# Patient Record
Sex: FEMALE | Race: WHITE | NOT HISPANIC OR LATINO
[De-identification: names, ages, dates, MRNs, and addresses within clinical notes are randomized per-mention and may not be internally consistent; named-entity substitution may affect disease eponyms.]

---

## 2017-03-06 ENCOUNTER — RESULT REVIEW (OUTPATIENT)
Age: 47
End: 2017-03-06

## 2017-07-21 ENCOUNTER — APPOINTMENT (OUTPATIENT)
Dept: ULTRASOUND IMAGING | Facility: IMAGING CENTER | Age: 47
End: 2017-07-21

## 2017-07-21 ENCOUNTER — OUTPATIENT (OUTPATIENT)
Dept: OUTPATIENT SERVICES | Facility: HOSPITAL | Age: 47
LOS: 1 days | End: 2017-07-21
Payer: COMMERCIAL

## 2017-07-21 ENCOUNTER — APPOINTMENT (OUTPATIENT)
Dept: MAMMOGRAPHY | Facility: IMAGING CENTER | Age: 47
End: 2017-07-21

## 2017-07-21 DIAGNOSIS — Z00.8 ENCOUNTER FOR OTHER GENERAL EXAMINATION: ICD-10-CM

## 2017-07-21 PROCEDURE — 77066 DX MAMMO INCL CAD BI: CPT

## 2017-07-21 PROCEDURE — G0279: CPT

## 2017-07-21 PROCEDURE — 76641 ULTRASOUND BREAST COMPLETE: CPT

## 2017-08-01 ENCOUNTER — OUTPATIENT (OUTPATIENT)
Dept: OUTPATIENT SERVICES | Facility: HOSPITAL | Age: 47
LOS: 1 days | End: 2017-08-01
Payer: COMMERCIAL

## 2017-08-01 ENCOUNTER — RESULT REVIEW (OUTPATIENT)
Age: 47
End: 2017-08-01

## 2017-08-01 ENCOUNTER — APPOINTMENT (OUTPATIENT)
Dept: ULTRASOUND IMAGING | Facility: IMAGING CENTER | Age: 47
End: 2017-08-01
Payer: COMMERCIAL

## 2017-08-01 DIAGNOSIS — Z00.8 ENCOUNTER FOR OTHER GENERAL EXAMINATION: ICD-10-CM

## 2017-08-01 PROCEDURE — 88173 CYTOPATH EVAL FNA REPORT: CPT

## 2017-08-01 PROCEDURE — 76942 ECHO GUIDE FOR BIOPSY: CPT

## 2017-08-01 PROCEDURE — 19084 BX BREAST ADD LESION US IMAG: CPT

## 2017-08-01 PROCEDURE — 19083 BX BREAST 1ST LESION US IMAG: CPT

## 2017-08-01 PROCEDURE — 19000 PUNCTURE ASPIR CYST BREAST: CPT

## 2017-08-01 PROCEDURE — A4648: CPT

## 2017-08-01 PROCEDURE — 88360 TUMOR IMMUNOHISTOCHEM/MANUAL: CPT

## 2017-08-01 PROCEDURE — G0206: CPT | Mod: 26,LT

## 2017-08-01 PROCEDURE — 88377 M/PHMTRC ALYS ISHQUANT/SEMIQ: CPT

## 2017-08-01 PROCEDURE — 19083 BX BREAST 1ST LESION US IMAG: CPT | Mod: LT

## 2017-08-01 PROCEDURE — 88377 M/PHMTRC ALYS ISHQUANT/SEMIQ: CPT | Mod: 26

## 2017-08-01 PROCEDURE — 19084 BX BREAST ADD LESION US IMAG: CPT | Mod: LT

## 2017-08-01 PROCEDURE — 88341 IMHCHEM/IMCYTCHM EA ADD ANTB: CPT

## 2017-08-01 PROCEDURE — 77065 DX MAMMO INCL CAD UNI: CPT

## 2017-08-01 PROCEDURE — 88342 IMHCHEM/IMCYTCHM 1ST ANTB: CPT | Mod: 26,59

## 2017-08-01 PROCEDURE — 88305 TISSUE EXAM BY PATHOLOGIST: CPT

## 2017-08-01 PROCEDURE — 88173 CYTOPATH EVAL FNA REPORT: CPT | Mod: 26

## 2017-08-01 PROCEDURE — 88360 TUMOR IMMUNOHISTOCHEM/MANUAL: CPT | Mod: 26

## 2017-08-01 PROCEDURE — 88341 IMHCHEM/IMCYTCHM EA ADD ANTB: CPT | Mod: 26,59

## 2017-08-01 PROCEDURE — 88342 IMHCHEM/IMCYTCHM 1ST ANTB: CPT

## 2017-08-01 PROCEDURE — 88305 TISSUE EXAM BY PATHOLOGIST: CPT | Mod: 26

## 2017-08-10 ENCOUNTER — APPOINTMENT (OUTPATIENT)
Dept: BREAST CENTER | Facility: CLINIC | Age: 47
End: 2017-08-10
Payer: COMMERCIAL

## 2017-08-10 VITALS
HEIGHT: 66 IN | DIASTOLIC BLOOD PRESSURE: 88 MMHG | SYSTOLIC BLOOD PRESSURE: 110 MMHG | WEIGHT: 145 LBS | HEART RATE: 78 BPM | BODY MASS INDEX: 23.3 KG/M2

## 2017-08-10 DIAGNOSIS — F15.90 OTHER STIMULANT USE, UNSPECIFIED, UNCOMPLICATED: ICD-10-CM

## 2017-08-10 DIAGNOSIS — Z80.0 FAMILY HISTORY OF MALIGNANT NEOPLASM OF DIGESTIVE ORGANS: ICD-10-CM

## 2017-08-10 PROCEDURE — 99244 OFF/OP CNSLTJ NEW/EST MOD 40: CPT

## 2017-08-10 RX ORDER — CIPROFLOXACIN HYDROCHLORIDE 500 MG/1
500 TABLET, FILM COATED ORAL
Qty: 6 | Refills: 0 | Status: DISCONTINUED | COMMUNITY
Start: 2017-03-07 | End: 2017-08-10

## 2017-08-10 RX ORDER — IBUPROFEN 800 MG/1
800 TABLET, FILM COATED ORAL
Qty: 20 | Refills: 0 | Status: DISCONTINUED | COMMUNITY
Start: 2017-03-07 | End: 2017-08-10

## 2017-08-11 ENCOUNTER — OUTPATIENT (OUTPATIENT)
Dept: OUTPATIENT SERVICES | Facility: HOSPITAL | Age: 47
LOS: 1 days | End: 2017-08-11
Payer: COMMERCIAL

## 2017-08-11 ENCOUNTER — APPOINTMENT (OUTPATIENT)
Dept: MRI IMAGING | Facility: CLINIC | Age: 47
End: 2017-08-11
Payer: COMMERCIAL

## 2017-08-11 DIAGNOSIS — Z00.8 ENCOUNTER FOR OTHER GENERAL EXAMINATION: ICD-10-CM

## 2017-08-11 PROCEDURE — 77059 MRI BREAST BILATERAL: CPT | Mod: 26

## 2017-08-11 PROCEDURE — A9585: CPT

## 2017-08-11 PROCEDURE — 0159T: CPT | Mod: 26

## 2017-08-11 PROCEDURE — C8937: CPT

## 2017-08-11 PROCEDURE — C8908: CPT

## 2017-08-14 ENCOUNTER — TRANSCRIPTION ENCOUNTER (OUTPATIENT)
Age: 47
End: 2017-08-14

## 2017-08-14 ENCOUNTER — FORM ENCOUNTER (OUTPATIENT)
Age: 47
End: 2017-08-14

## 2017-08-15 ENCOUNTER — APPOINTMENT (OUTPATIENT)
Dept: NUCLEAR MEDICINE | Facility: CLINIC | Age: 47
End: 2017-08-15

## 2017-08-15 ENCOUNTER — OUTPATIENT (OUTPATIENT)
Dept: OUTPATIENT SERVICES | Facility: HOSPITAL | Age: 47
LOS: 1 days | End: 2017-08-15
Payer: COMMERCIAL

## 2017-08-15 DIAGNOSIS — Z00.8 ENCOUNTER FOR OTHER GENERAL EXAMINATION: ICD-10-CM

## 2017-08-15 PROCEDURE — A9552: CPT

## 2017-08-15 PROCEDURE — 78815 PET IMAGE W/CT SKULL-THIGH: CPT

## 2017-08-15 PROCEDURE — 78815 PET IMAGE W/CT SKULL-THIGH: CPT | Mod: 26,PI

## 2017-09-06 ENCOUNTER — APPOINTMENT (OUTPATIENT)
Dept: BREAST CENTER | Facility: CLINIC | Age: 47
End: 2017-09-06
Payer: COMMERCIAL

## 2017-09-06 PROCEDURE — 99214 OFFICE O/P EST MOD 30 MIN: CPT

## 2017-09-19 ENCOUNTER — APPOINTMENT (OUTPATIENT)
Dept: BREAST CENTER | Facility: CLINIC | Age: 47
End: 2017-09-19
Payer: COMMERCIAL

## 2017-09-19 PROCEDURE — 99214 OFFICE O/P EST MOD 30 MIN: CPT

## 2017-09-26 ENCOUNTER — OUTPATIENT (OUTPATIENT)
Dept: OUTPATIENT SERVICES | Facility: HOSPITAL | Age: 47
LOS: 1 days | End: 2017-09-26
Payer: COMMERCIAL

## 2017-09-26 ENCOUNTER — APPOINTMENT (OUTPATIENT)
Dept: ULTRASOUND IMAGING | Facility: CLINIC | Age: 47
End: 2017-09-26
Payer: COMMERCIAL

## 2017-09-26 DIAGNOSIS — Z00.8 ENCOUNTER FOR OTHER GENERAL EXAMINATION: ICD-10-CM

## 2017-09-26 PROCEDURE — 76642 ULTRASOUND BREAST LIMITED: CPT

## 2017-09-27 PROCEDURE — 76641 ULTRASOUND BREAST COMPLETE: CPT | Mod: 26,LT

## 2017-09-27 PROCEDURE — 76642 ULTRASOUND BREAST LIMITED: CPT | Mod: 26,LT

## 2017-10-03 ENCOUNTER — APPOINTMENT (OUTPATIENT)
Dept: BREAST CENTER | Facility: CLINIC | Age: 47
End: 2017-10-03
Payer: COMMERCIAL

## 2017-10-03 PROCEDURE — 99213 OFFICE O/P EST LOW 20 MIN: CPT

## 2017-11-07 ENCOUNTER — APPOINTMENT (OUTPATIENT)
Dept: BREAST CENTER | Facility: CLINIC | Age: 47
End: 2017-11-07
Payer: COMMERCIAL

## 2017-11-07 VITALS
HEIGHT: 66 IN | HEART RATE: 74 BPM | WEIGHT: 143 LBS | SYSTOLIC BLOOD PRESSURE: 118 MMHG | BODY MASS INDEX: 22.98 KG/M2 | DIASTOLIC BLOOD PRESSURE: 78 MMHG

## 2017-11-07 PROCEDURE — 99214 OFFICE O/P EST MOD 30 MIN: CPT

## 2017-11-17 ENCOUNTER — OUTPATIENT (OUTPATIENT)
Dept: OUTPATIENT SERVICES | Facility: HOSPITAL | Age: 47
LOS: 1 days | Discharge: HOME | End: 2017-11-17
Payer: COMMERCIAL

## 2017-11-17 ENCOUNTER — RESULT REVIEW (OUTPATIENT)
Age: 47
End: 2017-11-17

## 2017-11-17 DIAGNOSIS — C50.812 MALIGNANT NEOPLASM OF OVERLAPPING SITES OF LEFT FEMALE BREAST: ICD-10-CM

## 2017-11-20 ENCOUNTER — RESULT REVIEW (OUTPATIENT)
Age: 47
End: 2017-11-20

## 2017-11-20 PROCEDURE — 88321 CONSLTJ&REPRT SLD PREP ELSWR: CPT

## 2017-11-21 LAB
NON-GYN CYTOLOGY SPEC: SIGNIFICANT CHANGE UP
SURGICAL PATHOLOGY FINAL REPORT - CH: SIGNIFICANT CHANGE UP

## 2017-12-06 ENCOUNTER — APPOINTMENT (OUTPATIENT)
Dept: CT IMAGING | Facility: CLINIC | Age: 47
End: 2017-12-06
Payer: COMMERCIAL

## 2017-12-06 ENCOUNTER — OUTPATIENT (OUTPATIENT)
Dept: OUTPATIENT SERVICES | Facility: HOSPITAL | Age: 47
LOS: 1 days | End: 2017-12-06
Payer: COMMERCIAL

## 2017-12-06 DIAGNOSIS — Z00.8 ENCOUNTER FOR OTHER GENERAL EXAMINATION: ICD-10-CM

## 2017-12-06 PROCEDURE — 74174 CTA ABD&PLVS W/CONTRAST: CPT | Mod: 26

## 2017-12-06 PROCEDURE — 74174 CTA ABD&PLVS W/CONTRAST: CPT

## 2017-12-12 ENCOUNTER — APPOINTMENT (OUTPATIENT)
Dept: BREAST CENTER | Facility: CLINIC | Age: 47
End: 2017-12-12
Payer: COMMERCIAL

## 2017-12-12 PROCEDURE — 99214 OFFICE O/P EST MOD 30 MIN: CPT | Mod: 25

## 2017-12-17 ENCOUNTER — MOBILE ON CALL (OUTPATIENT)
Age: 47
End: 2017-12-17

## 2018-01-16 ENCOUNTER — OUTPATIENT (OUTPATIENT)
Dept: OUTPATIENT SERVICES | Facility: HOSPITAL | Age: 48
LOS: 1 days | Discharge: HOME | End: 2018-01-16
Payer: COMMERCIAL

## 2018-01-16 ENCOUNTER — APPOINTMENT (OUTPATIENT)
Dept: BREAST CENTER | Facility: CLINIC | Age: 48
End: 2018-01-16
Payer: COMMERCIAL

## 2018-01-16 VITALS
WEIGHT: 146 LBS | HEIGHT: 66 IN | SYSTOLIC BLOOD PRESSURE: 102 MMHG | BODY MASS INDEX: 23.46 KG/M2 | DIASTOLIC BLOOD PRESSURE: 60 MMHG | HEART RATE: 88 BPM

## 2018-01-16 DIAGNOSIS — Z90.89 ACQUIRED ABSENCE OF OTHER ORGANS: Chronic | ICD-10-CM

## 2018-01-16 DIAGNOSIS — C50.812 MALIGNANT NEOPLASM OF OVERLAPPING SITES OF LEFT FEMALE BREAST: ICD-10-CM

## 2018-01-16 DIAGNOSIS — Z98.891 HISTORY OF UTERINE SCAR FROM PREVIOUS SURGERY: Chronic | ICD-10-CM

## 2018-01-16 LAB
ALBUMIN SERPL ELPH-MCNC: 3.8 G/DL — SIGNIFICANT CHANGE UP (ref 3.3–5)
ALP SERPL-CCNC: 79 U/L — SIGNIFICANT CHANGE UP (ref 40–120)
ALT FLD-CCNC: 32 U/L — SIGNIFICANT CHANGE UP (ref 12–78)
ANION GAP SERPL CALC-SCNC: 8 MMOL/L — SIGNIFICANT CHANGE UP (ref 5–17)
APTT BLD: 34.1 SEC — SIGNIFICANT CHANGE UP (ref 27.5–37.4)
AST SERPL-CCNC: 18 U/L — SIGNIFICANT CHANGE UP (ref 15–37)
BASOPHILS # BLD AUTO: 0.1 K/UL — SIGNIFICANT CHANGE UP (ref 0–0.2)
BASOPHILS NFR BLD AUTO: 1.8 % — SIGNIFICANT CHANGE UP (ref 0–2)
BILIRUB SERPL-MCNC: 0.4 MG/DL — SIGNIFICANT CHANGE UP (ref 0.2–1.2)
BLD GP AB SCN SERPL QL: SIGNIFICANT CHANGE UP
BUN SERPL-MCNC: 13 MG/DL — SIGNIFICANT CHANGE UP (ref 7–23)
CALCIUM SERPL-MCNC: 9.1 MG/DL — SIGNIFICANT CHANGE UP (ref 8.5–10.1)
CHLORIDE SERPL-SCNC: 104 MMOL/L — SIGNIFICANT CHANGE UP (ref 96–108)
CO2 SERPL-SCNC: 29 MMOL/L — SIGNIFICANT CHANGE UP (ref 22–31)
CREAT SERPL-MCNC: 0.67 MG/DL — SIGNIFICANT CHANGE UP (ref 0.5–1.3)
EOSINOPHIL # BLD AUTO: 0 K/UL — SIGNIFICANT CHANGE UP (ref 0–0.5)
EOSINOPHIL NFR BLD AUTO: 0.5 % — SIGNIFICANT CHANGE UP (ref 0–6)
GLUCOSE SERPL-MCNC: 95 MG/DL — SIGNIFICANT CHANGE UP (ref 70–99)
HCT VFR BLD CALC: 32.8 % — LOW (ref 34.5–45)
HGB BLD-MCNC: 11 G/DL — LOW (ref 11.5–15.5)
INR BLD: 1.09 RATIO — SIGNIFICANT CHANGE UP (ref 0.88–1.16)
LYMPHOCYTES # BLD AUTO: 0.9 K/UL — LOW (ref 1–3.3)
LYMPHOCYTES # BLD AUTO: 24.2 % — SIGNIFICANT CHANGE UP (ref 13–44)
MCHC RBC-ENTMCNC: 33.5 GM/DL — SIGNIFICANT CHANGE UP (ref 32–36)
MCHC RBC-ENTMCNC: 35.2 PG — HIGH (ref 27–34)
MCV RBC AUTO: 105.2 FL — HIGH (ref 80–100)
MONOCYTES # BLD AUTO: 0.4 K/UL — SIGNIFICANT CHANGE UP (ref 0–0.9)
MONOCYTES NFR BLD AUTO: 11.4 % — SIGNIFICANT CHANGE UP (ref 2–14)
NEUTROPHILS # BLD AUTO: 2.4 K/UL — SIGNIFICANT CHANGE UP (ref 1.8–7.4)
NEUTROPHILS NFR BLD AUTO: 62.2 % — SIGNIFICANT CHANGE UP (ref 43–77)
PLATELET # BLD AUTO: 239 K/UL — SIGNIFICANT CHANGE UP (ref 150–400)
POTASSIUM SERPL-MCNC: 3.7 MMOL/L — SIGNIFICANT CHANGE UP (ref 3.5–5.3)
POTASSIUM SERPL-SCNC: 3.7 MMOL/L — SIGNIFICANT CHANGE UP (ref 3.5–5.3)
PROT SERPL-MCNC: 7 GM/DL — SIGNIFICANT CHANGE UP (ref 6–8.3)
PROTHROM AB SERPL-ACNC: 11.8 SEC — SIGNIFICANT CHANGE UP (ref 9.8–12.7)
RBC # BLD: 3.12 M/UL — LOW (ref 3.8–5.2)
RBC # FLD: 16.5 % — HIGH (ref 10.3–14.5)
SODIUM SERPL-SCNC: 141 MMOL/L — SIGNIFICANT CHANGE UP (ref 135–145)
TYPE + AB SCN PNL BLD: SIGNIFICANT CHANGE UP
WBC # BLD: 3.9 K/UL — SIGNIFICANT CHANGE UP (ref 3.8–10.5)
WBC # FLD AUTO: 3.9 K/UL — SIGNIFICANT CHANGE UP (ref 3.8–10.5)

## 2018-01-16 PROCEDURE — 93010 ELECTROCARDIOGRAM REPORT: CPT

## 2018-01-16 PROCEDURE — 99214 OFFICE O/P EST MOD 30 MIN: CPT

## 2018-01-16 RX ORDER — INFLUENZA VIRUS VACCINE 15; 15; 15; 15 UG/.5ML; UG/.5ML; UG/.5ML; UG/.5ML
0.5 SUSPENSION INTRAMUSCULAR ONCE
Qty: 0 | Refills: 0 | Status: DISCONTINUED | OUTPATIENT
Start: 2018-01-16 | End: 2018-01-31

## 2018-01-16 NOTE — CHART NOTE - NSCHARTNOTEFT_GEN_A_CORE
Plan  1. Stop all NSAIDS, herbal supplements and vitamins for 7 days.  2. NPO at midnight.  3. Use EZ sponges as directed    Vital signs:    /61  T97.7  P85  R20  O2sat 100%

## 2018-01-23 RX ORDER — OXYCODONE HYDROCHLORIDE 5 MG/1
10 TABLET ORAL ONCE
Qty: 0 | Refills: 0 | Status: DISCONTINUED | OUTPATIENT
Start: 2018-01-24 | End: 2018-01-24

## 2018-01-23 RX ORDER — HYDROMORPHONE HYDROCHLORIDE 2 MG/ML
0.5 INJECTION INTRAMUSCULAR; INTRAVENOUS; SUBCUTANEOUS
Qty: 0 | Refills: 0 | Status: DISCONTINUED | OUTPATIENT
Start: 2018-01-24 | End: 2018-01-25

## 2018-01-23 RX ORDER — FAMOTIDINE 10 MG/ML
20 INJECTION INTRAVENOUS ONCE
Qty: 0 | Refills: 0 | Status: COMPLETED | OUTPATIENT
Start: 2018-01-24 | End: 2018-01-24

## 2018-01-23 RX ORDER — ONDANSETRON 8 MG/1
4 TABLET, FILM COATED ORAL ONCE
Qty: 0 | Refills: 0 | Status: DISCONTINUED | OUTPATIENT
Start: 2018-01-24 | End: 2018-01-25

## 2018-01-23 RX ORDER — ACETAMINOPHEN 500 MG
975 TABLET ORAL ONCE
Qty: 0 | Refills: 0 | Status: COMPLETED | OUTPATIENT
Start: 2018-01-24 | End: 2018-01-24

## 2018-01-23 RX ORDER — SODIUM CHLORIDE 9 MG/ML
1000 INJECTION INTRAMUSCULAR; INTRAVENOUS; SUBCUTANEOUS
Qty: 0 | Refills: 0 | Status: DISCONTINUED | OUTPATIENT
Start: 2018-01-24 | End: 2018-01-25

## 2018-01-23 RX ORDER — MEPERIDINE HYDROCHLORIDE 50 MG/ML
12.5 INJECTION INTRAMUSCULAR; INTRAVENOUS; SUBCUTANEOUS
Qty: 0 | Refills: 0 | Status: DISCONTINUED | OUTPATIENT
Start: 2018-01-24 | End: 2018-01-25

## 2018-01-23 RX ORDER — OXYCODONE HYDROCHLORIDE 5 MG/1
5 TABLET ORAL EVERY 4 HOURS
Qty: 0 | Refills: 0 | Status: DISCONTINUED | OUTPATIENT
Start: 2018-01-24 | End: 2018-01-25

## 2018-01-24 ENCOUNTER — RESULT REVIEW (OUTPATIENT)
Age: 48
End: 2018-01-24

## 2018-01-24 ENCOUNTER — APPOINTMENT (OUTPATIENT)
Dept: BREAST CENTER | Facility: HOSPITAL | Age: 48
End: 2018-01-24
Payer: COMMERCIAL

## 2018-01-24 ENCOUNTER — INPATIENT (INPATIENT)
Facility: HOSPITAL | Age: 48
LOS: 2 days | Discharge: HOME | End: 2018-01-27
Attending: SURGERY | Admitting: SURGERY
Payer: COMMERCIAL

## 2018-01-24 VITALS
DIASTOLIC BLOOD PRESSURE: 80 MMHG | WEIGHT: 147.27 LBS | RESPIRATION RATE: 16 BRPM | SYSTOLIC BLOOD PRESSURE: 115 MMHG | HEART RATE: 101 BPM | HEIGHT: 65 IN | TEMPERATURE: 99 F | OXYGEN SATURATION: 100 %

## 2018-01-24 DIAGNOSIS — K42.9 UMBILICAL HERNIA WITHOUT OBSTRUCTION OR GANGRENE: ICD-10-CM

## 2018-01-24 DIAGNOSIS — Z98.891 HISTORY OF UTERINE SCAR FROM PREVIOUS SURGERY: Chronic | ICD-10-CM

## 2018-01-24 DIAGNOSIS — Z90.89 ACQUIRED ABSENCE OF OTHER ORGANS: Chronic | ICD-10-CM

## 2018-01-24 DIAGNOSIS — Z92.21 PERSONAL HISTORY OF ANTINEOPLASTIC CHEMOTHERAPY: ICD-10-CM

## 2018-01-24 LAB
ANION GAP SERPL CALC-SCNC: 6 MMOL/L — SIGNIFICANT CHANGE UP (ref 5–17)
BUN SERPL-MCNC: 10 MG/DL — SIGNIFICANT CHANGE UP (ref 7–23)
CALCIUM SERPL-MCNC: 7.1 MG/DL — LOW (ref 8.5–10.1)
CHLORIDE SERPL-SCNC: 110 MMOL/L — HIGH (ref 96–108)
CO2 SERPL-SCNC: 25 MMOL/L — SIGNIFICANT CHANGE UP (ref 22–31)
CREAT SERPL-MCNC: 0.67 MG/DL — SIGNIFICANT CHANGE UP (ref 0.5–1.3)
GLUCOSE SERPL-MCNC: 163 MG/DL — HIGH (ref 70–99)
HCG UR QL: NEGATIVE — SIGNIFICANT CHANGE UP
HCT VFR BLD CALC: 25.7 % — LOW (ref 34.5–45)
HCT VFR BLD CALC: 32.3 % — LOW (ref 34.5–45)
HGB BLD-MCNC: 10.8 G/DL — LOW (ref 11.5–15.5)
HGB BLD-MCNC: 8.6 G/DL — LOW (ref 11.5–15.5)
MCHC RBC-ENTMCNC: 33.4 PG — SIGNIFICANT CHANGE UP (ref 27–34)
MCHC RBC-ENTMCNC: 33.5 GM/DL — SIGNIFICANT CHANGE UP (ref 32–36)
MCV RBC AUTO: 99.7 FL — SIGNIFICANT CHANGE UP (ref 80–100)
PLATELET # BLD AUTO: 173 K/UL — SIGNIFICANT CHANGE UP (ref 150–400)
POTASSIUM SERPL-MCNC: 3.8 MMOL/L — SIGNIFICANT CHANGE UP (ref 3.5–5.3)
POTASSIUM SERPL-SCNC: 3.8 MMOL/L — SIGNIFICANT CHANGE UP (ref 3.5–5.3)
RBC # BLD: 3.24 M/UL — LOW (ref 3.8–5.2)
RBC # FLD: 16.8 % — HIGH (ref 10.3–14.5)
SODIUM SERPL-SCNC: 141 MMOL/L — SIGNIFICANT CHANGE UP (ref 135–145)
WBC # BLD: 10.3 K/UL — SIGNIFICANT CHANGE UP (ref 3.8–10.5)
WBC # FLD AUTO: 10.3 K/UL — SIGNIFICANT CHANGE UP (ref 3.8–10.5)

## 2018-01-24 PROCEDURE — 88307 TISSUE EXAM BY PATHOLOGIST: CPT | Mod: 26

## 2018-01-24 PROCEDURE — 88305 TISSUE EXAM BY PATHOLOGIST: CPT | Mod: 26

## 2018-01-24 PROCEDURE — 88300 SURGICAL PATH GROSS: CPT | Mod: 26,59

## 2018-01-24 PROCEDURE — 88302 TISSUE EXAM BY PATHOLOGIST: CPT | Mod: 26

## 2018-01-24 PROCEDURE — 19307 MAST MOD RAD: CPT

## 2018-01-24 PROCEDURE — 88331 PATH CONSLTJ SURG 1 BLK 1SPC: CPT | Mod: 26

## 2018-01-24 RX ORDER — IBUPROFEN 200 MG
400 TABLET ORAL EVERY 6 HOURS
Qty: 0 | Refills: 0 | Status: DISCONTINUED | OUTPATIENT
Start: 2018-01-25 | End: 2018-01-27

## 2018-01-24 RX ORDER — SODIUM CHLORIDE 9 MG/ML
1000 INJECTION, SOLUTION INTRAVENOUS
Qty: 0 | Refills: 0 | Status: DISCONTINUED | OUTPATIENT
Start: 2018-01-25 | End: 2018-01-25

## 2018-01-24 RX ORDER — ACETAMINOPHEN 500 MG
1000 TABLET ORAL ONCE
Qty: 0 | Refills: 0 | Status: COMPLETED | OUTPATIENT
Start: 2018-01-25 | End: 2018-01-25

## 2018-01-24 RX ORDER — HEPARIN SODIUM 5000 [USP'U]/ML
5000 INJECTION INTRAVENOUS; SUBCUTANEOUS EVERY 12 HOURS
Qty: 0 | Refills: 0 | Status: DISCONTINUED | OUTPATIENT
Start: 2018-01-24 | End: 2018-01-27

## 2018-01-24 RX ORDER — DOCUSATE SODIUM 100 MG
100 CAPSULE ORAL THREE TIMES A DAY
Qty: 0 | Refills: 0 | Status: DISCONTINUED | OUTPATIENT
Start: 2018-01-24 | End: 2018-01-27

## 2018-01-24 RX ORDER — ACETAMINOPHEN 500 MG
1000 TABLET ORAL ONCE
Qty: 0 | Refills: 0 | Status: COMPLETED | OUTPATIENT
Start: 2018-01-24 | End: 2018-01-24

## 2018-01-24 RX ORDER — KETOROLAC TROMETHAMINE 30 MG/ML
15 SYRINGE (ML) INJECTION EVERY 6 HOURS
Qty: 0 | Refills: 0 | Status: COMPLETED | OUTPATIENT
Start: 2018-01-25 | End: 2018-01-30

## 2018-01-24 RX ORDER — ACETAMINOPHEN 500 MG
975 TABLET ORAL EVERY 8 HOURS
Qty: 0 | Refills: 0 | Status: DISCONTINUED | OUTPATIENT
Start: 2018-01-25 | End: 2018-01-27

## 2018-01-24 RX ORDER — CEFAZOLIN SODIUM 1 G
2000 VIAL (EA) INJECTION EVERY 8 HOURS
Qty: 0 | Refills: 0 | Status: COMPLETED | OUTPATIENT
Start: 2018-01-24 | End: 2018-01-25

## 2018-01-24 RX ORDER — SODIUM CHLORIDE 9 MG/ML
3 INJECTION INTRAMUSCULAR; INTRAVENOUS; SUBCUTANEOUS EVERY 8 HOURS
Qty: 0 | Refills: 0 | Status: DISCONTINUED | OUTPATIENT
Start: 2018-01-24 | End: 2018-01-24

## 2018-01-24 RX ADMIN — HYDROMORPHONE HYDROCHLORIDE 0.5 MILLIGRAM(S): 2 INJECTION INTRAMUSCULAR; INTRAVENOUS; SUBCUTANEOUS at 20:30

## 2018-01-24 RX ADMIN — HYDROMORPHONE HYDROCHLORIDE 0.5 MILLIGRAM(S): 2 INJECTION INTRAMUSCULAR; INTRAVENOUS; SUBCUTANEOUS at 20:05

## 2018-01-24 RX ADMIN — HYDROMORPHONE HYDROCHLORIDE 0.5 MILLIGRAM(S): 2 INJECTION INTRAMUSCULAR; INTRAVENOUS; SUBCUTANEOUS at 21:30

## 2018-01-24 RX ADMIN — HYDROMORPHONE HYDROCHLORIDE 0.5 MILLIGRAM(S): 2 INJECTION INTRAMUSCULAR; INTRAVENOUS; SUBCUTANEOUS at 23:30

## 2018-01-24 RX ADMIN — HYDROMORPHONE HYDROCHLORIDE 0.5 MILLIGRAM(S): 2 INJECTION INTRAMUSCULAR; INTRAVENOUS; SUBCUTANEOUS at 23:01

## 2018-01-24 RX ADMIN — SODIUM CHLORIDE 100 MILLILITER(S): 9 INJECTION INTRAMUSCULAR; INTRAVENOUS; SUBCUTANEOUS at 19:09

## 2018-01-24 RX ADMIN — HYDROMORPHONE HYDROCHLORIDE 0.5 MILLIGRAM(S): 2 INJECTION INTRAMUSCULAR; INTRAVENOUS; SUBCUTANEOUS at 19:08

## 2018-01-24 RX ADMIN — HYDROMORPHONE HYDROCHLORIDE 0.5 MILLIGRAM(S): 2 INJECTION INTRAMUSCULAR; INTRAVENOUS; SUBCUTANEOUS at 19:30

## 2018-01-24 RX ADMIN — FAMOTIDINE 20 MILLIGRAM(S): 10 INJECTION INTRAVENOUS at 08:22

## 2018-01-24 RX ADMIN — Medication 200 MILLIGRAM(S): at 22:26

## 2018-01-24 RX ADMIN — HYDROMORPHONE HYDROCHLORIDE 0.5 MILLIGRAM(S): 2 INJECTION INTRAMUSCULAR; INTRAVENOUS; SUBCUTANEOUS at 20:47

## 2018-01-24 RX ADMIN — Medication 975 MILLIGRAM(S): at 08:22

## 2018-01-24 RX ADMIN — OXYCODONE HYDROCHLORIDE 10 MILLIGRAM(S): 5 TABLET ORAL at 08:22

## 2018-01-24 RX ADMIN — HYDROMORPHONE HYDROCHLORIDE 0.5 MILLIGRAM(S): 2 INJECTION INTRAMUSCULAR; INTRAVENOUS; SUBCUTANEOUS at 18:55

## 2018-01-24 NOTE — BRIEF OPERATIVE NOTE - PROCEDURE
<<-----Click on this checkbox to enter Procedure Modified radical mastectomy of left breast  01/24/2018    [facility]

## 2018-01-24 NOTE — BRIEF OPERATIVE NOTE - PRE-OP DX
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

## 2018-01-24 NOTE — BRIEF OPERATIVE NOTE - POST-OP DX
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

## 2018-01-25 LAB
ANION GAP SERPL CALC-SCNC: 7 MMOL/L — SIGNIFICANT CHANGE UP (ref 5–17)
BUN SERPL-MCNC: 9 MG/DL — SIGNIFICANT CHANGE UP (ref 7–23)
CALCIUM SERPL-MCNC: 7.7 MG/DL — LOW (ref 8.5–10.1)
CHLORIDE SERPL-SCNC: 106 MMOL/L — SIGNIFICANT CHANGE UP (ref 96–108)
CO2 SERPL-SCNC: 27 MMOL/L — SIGNIFICANT CHANGE UP (ref 22–31)
CREAT SERPL-MCNC: 0.65 MG/DL — SIGNIFICANT CHANGE UP (ref 0.5–1.3)
GLUCOSE SERPL-MCNC: 93 MG/DL — SIGNIFICANT CHANGE UP (ref 70–99)
HCT VFR BLD CALC: 30.8 % — LOW (ref 34.5–45)
HGB BLD-MCNC: 10.1 G/DL — LOW (ref 11.5–15.5)
MCHC RBC-ENTMCNC: 32.8 GM/DL — SIGNIFICANT CHANGE UP (ref 32–36)
MCHC RBC-ENTMCNC: 32.8 PG — SIGNIFICANT CHANGE UP (ref 27–34)
MCV RBC AUTO: 99.8 FL — SIGNIFICANT CHANGE UP (ref 80–100)
PLATELET # BLD AUTO: 159 K/UL — SIGNIFICANT CHANGE UP (ref 150–400)
POTASSIUM SERPL-MCNC: 3.8 MMOL/L — SIGNIFICANT CHANGE UP (ref 3.5–5.3)
POTASSIUM SERPL-SCNC: 3.8 MMOL/L — SIGNIFICANT CHANGE UP (ref 3.5–5.3)
RBC # BLD: 3.08 M/UL — LOW (ref 3.8–5.2)
RBC # FLD: 17.8 % — HIGH (ref 10.3–14.5)
SODIUM SERPL-SCNC: 140 MMOL/L — SIGNIFICANT CHANGE UP (ref 135–145)
WBC # BLD: 5.8 K/UL — SIGNIFICANT CHANGE UP (ref 3.8–10.5)
WBC # FLD AUTO: 5.8 K/UL — SIGNIFICANT CHANGE UP (ref 3.8–10.5)

## 2018-01-25 RX ORDER — KETOROLAC TROMETHAMINE 30 MG/ML
15 SYRINGE (ML) INJECTION EVERY 6 HOURS
Qty: 0 | Refills: 0 | Status: DISCONTINUED | OUTPATIENT
Start: 2018-01-25 | End: 2018-01-25

## 2018-01-25 RX ORDER — HYDROMORPHONE HYDROCHLORIDE 2 MG/ML
0.5 INJECTION INTRAMUSCULAR; INTRAVENOUS; SUBCUTANEOUS ONCE
Qty: 0 | Refills: 0 | Status: DISCONTINUED | OUTPATIENT
Start: 2018-01-25 | End: 2018-01-25

## 2018-01-25 RX ORDER — SODIUM CHLORIDE 9 MG/ML
500 INJECTION INTRAMUSCULAR; INTRAVENOUS; SUBCUTANEOUS ONCE
Qty: 0 | Refills: 0 | Status: COMPLETED | OUTPATIENT
Start: 2018-01-25 | End: 2018-01-25

## 2018-01-25 RX ORDER — SODIUM CHLORIDE 9 MG/ML
1000 INJECTION INTRAMUSCULAR; INTRAVENOUS; SUBCUTANEOUS
Qty: 0 | Refills: 0 | Status: DISCONTINUED | OUTPATIENT
Start: 2018-01-25 | End: 2018-01-26

## 2018-01-25 RX ORDER — HYDROMORPHONE HYDROCHLORIDE 2 MG/ML
0.5 INJECTION INTRAMUSCULAR; INTRAVENOUS; SUBCUTANEOUS EVERY 4 HOURS
Qty: 0 | Refills: 0 | Status: DISCONTINUED | OUTPATIENT
Start: 2018-01-25 | End: 2018-01-27

## 2018-01-25 RX ORDER — OXYCODONE HYDROCHLORIDE 5 MG/1
5 TABLET ORAL EVERY 4 HOURS
Qty: 0 | Refills: 0 | Status: DISCONTINUED | OUTPATIENT
Start: 2018-01-25 | End: 2018-01-27

## 2018-01-25 RX ORDER — OXYCODONE HYDROCHLORIDE 5 MG/1
10 TABLET ORAL EVERY 4 HOURS
Qty: 0 | Refills: 0 | Status: DISCONTINUED | OUTPATIENT
Start: 2018-01-25 | End: 2018-01-27

## 2018-01-25 RX ADMIN — Medication 400 MILLIGRAM(S): at 20:02

## 2018-01-25 RX ADMIN — OXYCODONE HYDROCHLORIDE 5 MILLIGRAM(S): 5 TABLET ORAL at 02:51

## 2018-01-25 RX ADMIN — Medication 1000 MILLIGRAM(S): at 02:48

## 2018-01-25 RX ADMIN — Medication 200 MILLIGRAM(S): at 05:20

## 2018-01-25 RX ADMIN — HYDROMORPHONE HYDROCHLORIDE 0.5 MILLIGRAM(S): 2 INJECTION INTRAMUSCULAR; INTRAVENOUS; SUBCUTANEOUS at 20:02

## 2018-01-25 RX ADMIN — HEPARIN SODIUM 5000 UNIT(S): 5000 INJECTION INTRAVENOUS; SUBCUTANEOUS at 18:14

## 2018-01-25 RX ADMIN — Medication 400 MILLIGRAM(S): at 10:01

## 2018-01-25 RX ADMIN — Medication 100 MILLIGRAM(S): at 21:55

## 2018-01-25 RX ADMIN — Medication 100 MILLIGRAM(S): at 13:20

## 2018-01-25 RX ADMIN — HYDROMORPHONE HYDROCHLORIDE 0.5 MILLIGRAM(S): 2 INJECTION INTRAMUSCULAR; INTRAVENOUS; SUBCUTANEOUS at 08:49

## 2018-01-25 RX ADMIN — Medication 400 MILLIGRAM(S): at 19:13

## 2018-01-25 RX ADMIN — SODIUM CHLORIDE 125 MILLILITER(S): 9 INJECTION, SOLUTION INTRAVENOUS at 03:09

## 2018-01-25 RX ADMIN — Medication 400 MILLIGRAM(S): at 01:52

## 2018-01-25 RX ADMIN — SODIUM CHLORIDE 500 MILLILITER(S): 9 INJECTION INTRAMUSCULAR; INTRAVENOUS; SUBCUTANEOUS at 21:55

## 2018-01-25 RX ADMIN — OXYCODONE HYDROCHLORIDE 10 MILLIGRAM(S): 5 TABLET ORAL at 15:15

## 2018-01-25 RX ADMIN — HYDROMORPHONE HYDROCHLORIDE 0.5 MILLIGRAM(S): 2 INJECTION INTRAMUSCULAR; INTRAVENOUS; SUBCUTANEOUS at 05:40

## 2018-01-25 RX ADMIN — HYDROMORPHONE HYDROCHLORIDE 0.5 MILLIGRAM(S): 2 INJECTION INTRAMUSCULAR; INTRAVENOUS; SUBCUTANEOUS at 08:19

## 2018-01-25 RX ADMIN — HYDROMORPHONE HYDROCHLORIDE 0.5 MILLIGRAM(S): 2 INJECTION INTRAMUSCULAR; INTRAVENOUS; SUBCUTANEOUS at 02:10

## 2018-01-25 RX ADMIN — HEPARIN SODIUM 5000 UNIT(S): 5000 INJECTION INTRAVENOUS; SUBCUTANEOUS at 05:26

## 2018-01-25 RX ADMIN — Medication 400 MILLIGRAM(S): at 13:20

## 2018-01-25 RX ADMIN — SODIUM CHLORIDE 100 MILLILITER(S): 9 INJECTION INTRAMUSCULAR; INTRAVENOUS; SUBCUTANEOUS at 22:58

## 2018-01-25 RX ADMIN — Medication 1000 MILLIGRAM(S): at 10:30

## 2018-01-25 RX ADMIN — HYDROMORPHONE HYDROCHLORIDE 0.5 MILLIGRAM(S): 2 INJECTION INTRAMUSCULAR; INTRAVENOUS; SUBCUTANEOUS at 05:21

## 2018-01-25 RX ADMIN — HYDROMORPHONE HYDROCHLORIDE 0.5 MILLIGRAM(S): 2 INJECTION INTRAMUSCULAR; INTRAVENOUS; SUBCUTANEOUS at 19:14

## 2018-01-25 RX ADMIN — OXYCODONE HYDROCHLORIDE 5 MILLIGRAM(S): 5 TABLET ORAL at 01:52

## 2018-01-25 RX ADMIN — HYDROMORPHONE HYDROCHLORIDE 0.5 MILLIGRAM(S): 2 INJECTION INTRAMUSCULAR; INTRAVENOUS; SUBCUTANEOUS at 01:52

## 2018-01-25 RX ADMIN — Medication 975 MILLIGRAM(S): at 18:14

## 2018-01-25 RX ADMIN — Medication 975 MILLIGRAM(S): at 20:02

## 2018-01-25 NOTE — PROGRESS NOTE ADULT - SUBJECTIVE AND OBJECTIVE BOX
Doing well  Pain controlled        MEDICATIONS  (STANDING):  acetaminophen   Tablet. 975 milliGRAM(s) Oral every 8 hours  acetaminophen  IVPB. 1000 milliGRAM(s) IV Intermittent once  docusate sodium 100 milliGRAM(s) Oral three times a day  heparin  Injectable 5000 Unit(s) SubCutaneous every 12 hours  ibuprofen  Tablet 400 milliGRAM(s) Oral every 6 hours    MEDICATIONS  (PRN):  HYDROmorphone  Injectable 0.5 milliGRAM(s) IV Push every 10 minutes PRN Severe Pain (7 - 10)  ketorolac   Injectable 15 milliGRAM(s) IV Push every 6 hours PRN Moderate Pain  meperidine     Injectable 12.5 milliGRAM(s) IV Push every 10 minutes PRN Shivering  ondansetron Injectable 4 milliGRAM(s) IV Push once PRN Nausea and/or Vomiting  oxyCODONE    IR 5 milliGRAM(s) Oral every 4 hours PRN For moderate pain      Vital Signs Last 24 Hrs  T(C): 36.7 (25 Jan 2018 00:00), Max: 37 (24 Jan 2018 08:08)  T(F): 98 (25 Jan 2018 00:00), Max: 98.6 (24 Jan 2018 08:08)  HR: 98 (25 Jan 2018 06:00) (76 - 120)  BP: 94/62 (25 Jan 2018 06:00) (94/62 - 128/75)  BP(mean): --  RR: 15 (25 Jan 2018 06:00) (8 - 20)  SpO2: 100% (25 Jan 2018 06:00) (99% - 100%)    I&O's Detail    24 Jan 2018 07:01  -  25 Jan 2018 06:22  --------------------------------------------------------  IN:    IV PiggyBack: 200 mL    lactated ringers.: 375 mL    Other: 5000 mL    sodium chloride 0.9%: 850 mL  Total IN: 6425 mL    OUT:    Bulb: 17 mL    Bulb: 91 mL    Bulb: 128 mL    Bulb: 77 mL    Indwelling Catheter - Urethral: 1940 mL    Other: 1040 mL  Total OUT: 3293 mL    Total NET: 3132 mL      Exam  Left Breast: Incisions clean, dry and intact.  No collections.  No erythema.  Skin viable.    Left breast Flaps:  Soft.  Capillary refill 2-3 seconds.  Viable  Viopitx:  91%    Abdomen:  Incision clean, dry and intact.   No collections.  No erythema.  Skin viable.      Assessment and Plan  s/p bilateral KAREEM flaps to left breast    ambulate  Transfer to floor  Vioptix q2hr  Remove Jayne WEEKS

## 2018-01-25 NOTE — PROGRESS NOTE ADULT - SUBJECTIVE AND OBJECTIVE BOX
STATUS POST:  Left [id number] radical mastectomy with KAREEM flap    POST OPERATIVE DAY #: 1    Vital Signs Last 24 Hrs  T(C): 36.9 (25 Jan 2018 07:30), Max: 37 (25 Jan 2018 06:00)  T(F): 98.5 (25 Jan 2018 07:30), Max: 98.6 (25 Jan 2018 06:00)  HR: 85 (25 Jan 2018 07:30) (76 - 120)  BP: 111/67 (25 Jan 2018 07:30) (94/62 - 128/75)  BP(mean): --  RR: 16 (25 Jan 2018 07:30) (8 - 20)  SpO2: 100% (25 Jan 2018 07:30) (99% - 100%)      SUBJECTIVE:  Severe incisional pain    I&O's Detail    24 Jan 2018 07:01  -  25 Jan 2018 07:00  --------------------------------------------------------  IN:    IV PiggyBack: 200 mL    lactated ringers.: 375 mL    Other: 5000 mL    sodium chloride 0.9%: 850 mL  Total IN: 6425 mL    OUT:    Bulb: 77 mL    Bulb: 138 mL    Bulb: 19 mL    Bulb: 96 mL    Indwelling Catheter - Urethral: 2180 mL    Other: 1040 mL  Total OUT: 3550 mL    Total NET: 2875 mL      MEDICATIONS  (STANDING):  acetaminophen   Tablet. 975 milliGRAM(s) Oral every 8 hours  acetaminophen  IVPB. 1000 milliGRAM(s) IV Intermittent once  docusate sodium 100 milliGRAM(s) Oral three times a day  heparin  Injectable 5000 Unit(s) SubCutaneous every 12 hours  ibuprofen  Tablet 400 milliGRAM(s) Oral every 6 hours    MEDICATIONS  (PRN):  HYDROmorphone  Injectable 0.5 milliGRAM(s) IV Push every 4 hours PRN Severe Pain (7 - 10)  ketorolac   Injectable 15 milliGRAM(s) IV Push every 6 hours PRN Moderate Pain  oxyCODONE    IR 10 milliGRAM(s) Oral every 4 hours PRN Severe Pain (7 - 10)      LABS:                        10.1   5.8   )-----------( 159      ( 25 Jan 2018 06:49 )             30.8     01-25    140  |  106  |  9   ----------------------------<  93  3.8   |  27  |  0.65    Ca    7.7<L>      25 Jan 2018 06:49

## 2018-01-26 LAB
APPEARANCE UR: CLEAR — SIGNIFICANT CHANGE UP
BILIRUB UR-MCNC: NEGATIVE — SIGNIFICANT CHANGE UP
COLOR SPEC: YELLOW — SIGNIFICANT CHANGE UP
DIFF PNL FLD: (no result)
EPI CELLS # UR: SIGNIFICANT CHANGE UP
GLUCOSE UR QL: NEGATIVE MG/DL — SIGNIFICANT CHANGE UP
HCT VFR BLD CALC: 29.7 % — LOW (ref 34.5–45)
HGB BLD-MCNC: 9.8 G/DL — LOW (ref 11.5–15.5)
KETONES UR-MCNC: NEGATIVE — SIGNIFICANT CHANGE UP
LEUKOCYTE ESTERASE UR-ACNC: NEGATIVE — SIGNIFICANT CHANGE UP
MCHC RBC-ENTMCNC: 33.2 GM/DL — SIGNIFICANT CHANGE UP (ref 32–36)
MCHC RBC-ENTMCNC: 33.3 PG — SIGNIFICANT CHANGE UP (ref 27–34)
MCV RBC AUTO: 100.5 FL — HIGH (ref 80–100)
NITRITE UR-MCNC: NEGATIVE — SIGNIFICANT CHANGE UP
PH UR: 6 — SIGNIFICANT CHANGE UP (ref 5–8)
PLATELET # BLD AUTO: 186 K/UL — SIGNIFICANT CHANGE UP (ref 150–400)
PROT UR-MCNC: NEGATIVE MG/DL — SIGNIFICANT CHANGE UP
RBC # BLD: 2.95 M/UL — LOW (ref 3.8–5.2)
RBC # FLD: 16.7 % — HIGH (ref 10.3–14.5)
RBC CASTS # UR COMP ASSIST: NEGATIVE /HPF — SIGNIFICANT CHANGE UP (ref 0–4)
SP GR SPEC: 1.01 — SIGNIFICANT CHANGE UP (ref 1.01–1.02)
UROBILINOGEN FLD QL: NEGATIVE MG/DL — SIGNIFICANT CHANGE UP
WBC # BLD: 6.4 K/UL — SIGNIFICANT CHANGE UP (ref 3.8–10.5)
WBC # FLD AUTO: 6.4 K/UL — SIGNIFICANT CHANGE UP (ref 3.8–10.5)
WBC UR QL: SIGNIFICANT CHANGE UP

## 2018-01-26 RX ORDER — ONDANSETRON 8 MG/1
4 TABLET, FILM COATED ORAL EVERY 6 HOURS
Qty: 0 | Refills: 0 | Status: COMPLETED | OUTPATIENT
Start: 2018-01-26 | End: 2018-01-26

## 2018-01-26 RX ADMIN — Medication 975 MILLIGRAM(S): at 20:00

## 2018-01-26 RX ADMIN — Medication 400 MILLIGRAM(S): at 20:45

## 2018-01-26 RX ADMIN — OXYCODONE HYDROCHLORIDE 10 MILLIGRAM(S): 5 TABLET ORAL at 10:15

## 2018-01-26 RX ADMIN — Medication 400 MILLIGRAM(S): at 03:06

## 2018-01-26 RX ADMIN — Medication 100 MILLIGRAM(S): at 05:14

## 2018-01-26 RX ADMIN — Medication 975 MILLIGRAM(S): at 17:53

## 2018-01-26 RX ADMIN — OXYCODONE HYDROCHLORIDE 10 MILLIGRAM(S): 5 TABLET ORAL at 09:45

## 2018-01-26 RX ADMIN — OXYCODONE HYDROCHLORIDE 10 MILLIGRAM(S): 5 TABLET ORAL at 20:45

## 2018-01-26 RX ADMIN — HYDROMORPHONE HYDROCHLORIDE 0.5 MILLIGRAM(S): 2 INJECTION INTRAMUSCULAR; INTRAVENOUS; SUBCUTANEOUS at 02:59

## 2018-01-26 RX ADMIN — Medication 400 MILLIGRAM(S): at 09:00

## 2018-01-26 RX ADMIN — ONDANSETRON 4 MILLIGRAM(S): 8 TABLET, FILM COATED ORAL at 09:31

## 2018-01-26 RX ADMIN — OXYCODONE HYDROCHLORIDE 10 MILLIGRAM(S): 5 TABLET ORAL at 14:09

## 2018-01-26 RX ADMIN — HEPARIN SODIUM 5000 UNIT(S): 5000 INJECTION INTRAVENOUS; SUBCUTANEOUS at 17:54

## 2018-01-26 RX ADMIN — Medication 400 MILLIGRAM(S): at 14:10

## 2018-01-26 RX ADMIN — Medication 400 MILLIGRAM(S): at 02:03

## 2018-01-26 RX ADMIN — HEPARIN SODIUM 5000 UNIT(S): 5000 INJECTION INTRAVENOUS; SUBCUTANEOUS at 05:14

## 2018-01-26 RX ADMIN — Medication 975 MILLIGRAM(S): at 12:00

## 2018-01-26 RX ADMIN — HYDROMORPHONE HYDROCHLORIDE 0.5 MILLIGRAM(S): 2 INJECTION INTRAMUSCULAR; INTRAVENOUS; SUBCUTANEOUS at 04:27

## 2018-01-26 RX ADMIN — OXYCODONE HYDROCHLORIDE 10 MILLIGRAM(S): 5 TABLET ORAL at 19:54

## 2018-01-26 RX ADMIN — Medication 975 MILLIGRAM(S): at 11:27

## 2018-01-26 RX ADMIN — Medication 975 MILLIGRAM(S): at 02:10

## 2018-01-26 RX ADMIN — Medication 400 MILLIGRAM(S): at 08:08

## 2018-01-26 RX ADMIN — Medication 400 MILLIGRAM(S): at 13:40

## 2018-01-26 RX ADMIN — Medication 400 MILLIGRAM(S): at 19:59

## 2018-01-26 RX ADMIN — Medication 100 MILLIGRAM(S): at 21:40

## 2018-01-26 RX ADMIN — Medication 100 MILLIGRAM(S): at 14:09

## 2018-01-26 RX ADMIN — Medication 975 MILLIGRAM(S): at 01:24

## 2018-01-26 RX ADMIN — OXYCODONE HYDROCHLORIDE 10 MILLIGRAM(S): 5 TABLET ORAL at 15:00

## 2018-01-26 NOTE — PROGRESS NOTE ADULT - SUBJECTIVE AND OBJECTIVE BOX
Ms. Kearns is POD#2 s/p L modified radical mastectomy with KAREEM flap reconstruction. Patient complains of pain by her left axilla and her abdomen. She is mildly tachycardic. Also ome difficulty with starting voiding although she can. No fevers.     Vital Signs Last 24 Hrs  T(C): 36.7 (26 Jan 2018 04:18), Max: 36.8 (25 Jan 2018 21:32)  T(F): 98 (26 Jan 2018 04:18), Max: 98.2 (25 Jan 2018 21:32)  HR: 103 (26 Jan 2018 06:44) (99 - 108)  BP: 101/67 (26 Jan 2018 06:44) (94/48 - 117/61)  BP(mean): --  RR: 17 (26 Jan 2018 04:18) (16 - 17)  SpO2: 96% (26 Jan 2018 04:18) (95% - 100%)    I&O's Detail    25 Jan 2018 07:01  -  26 Jan 2018 07:00  --------------------------------------------------------  IN:    Sodium Chloride 0.9% IV Bolus: 500 mL  Total IN: 500 mL    OUT:    Bulb: 63 mL    Bulb: 50 mL    Bulb: 100 mL    Bulb: 50 mL    Indwelling Catheter - Urethral: 2500 mL    Voided: 1250 mL  Total OUT: 4013 mL    Total NET: -3513 mL      26 Jan 2018 07:01  -  26 Jan 2018 09:36  --------------------------------------------------------  IN:  Total IN: 0 mL    OUT:    Voided: 500 mL  Total OUT: 500 mL    Total NET: -500 mL      Physical exam:  NAD  AAO  L chest - skin flap viable, mild ecchymosis in upper lateral breast, no hematoma, NAC slightly darker in upper outer aspect, KAREEM flap warm and viable Ms. Kearns is POD#2 s/p L modified radical mastectomy with KAREEM flap reconstruction. Patient complains of pain by her left axilla and her abdomen. She is mildly tachycardic. Also some difficulty with starting voiding although she can. No fevers.     Vital Signs Last 24 Hrs  T(C): 36.7 (26 Jan 2018 04:18), Max: 36.8 (25 Jan 2018 21:32)  T(F): 98 (26 Jan 2018 04:18), Max: 98.2 (25 Jan 2018 21:32)  HR: 103 (26 Jan 2018 06:44) (99 - 108)  BP: 101/67 (26 Jan 2018 06:44) (94/48 - 117/61)  BP(mean): --  RR: 17 (26 Jan 2018 04:18) (16 - 17)  SpO2: 96% (26 Jan 2018 04:18) (95% - 100%)    I&O's Detail    25 Jan 2018 07:01  -  26 Jan 2018 07:00  --------------------------------------------------------  IN:    Sodium Chloride 0.9% IV Bolus: 500 mL  Total IN: 500 mL    OUT:    Bulb: 63 mL    Bulb: 50 mL    Bulb: 100 mL    Bulb: 50 mL    Indwelling Catheter - Urethral: 2500 mL    Voided: 1250 mL  Total OUT: 4013 mL    Total NET: -3513 mL      26 Jan 2018 07:01  -  26 Jan 2018 09:36  --------------------------------------------------------  IN:  Total IN: 0 mL    OUT:    Voided: 500 mL  Total OUT: 500 mL    Total NET: -500 mL      Physical exam:  NAD  AAO  L chest - skin flap viable, mild ecchymosis in upper lateral breast, no hematoma, NAC slightly darker in upper outer aspect, KAREEM flap warm and viable

## 2018-01-26 NOTE — PROGRESS NOTE ADULT - SUBJECTIVE AND OBJECTIVE BOX
Doing well  Tachy overnight, IVF given  Also complained of pain overnight  Notes difficulty initiating urination        MEDICATIONS  (STANDING):  acetaminophen   Tablet. 975 milliGRAM(s) Oral every 8 hours  docusate sodium 100 milliGRAM(s) Oral three times a day  heparin  Injectable 5000 Unit(s) SubCutaneous every 12 hours  ibuprofen  Tablet 400 milliGRAM(s) Oral every 6 hours    MEDICATIONS  (PRN):  HYDROmorphone  Injectable 0.5 milliGRAM(s) IV Push every 4 hours PRN Severe Pain (7 - 10)  oxyCODONE    IR 5 milliGRAM(s) Oral every 4 hours PRN Mild Pain (1 - 3)  oxyCODONE    IR 10 milliGRAM(s) Oral every 4 hours PRN Severe Pain (7 - 10)      Vital Signs Last 24 Hrs  T(C): 36.7 (26 Jan 2018 04:18), Max: 36.9 (25 Jan 2018 07:30)  T(F): 98 (26 Jan 2018 04:18), Max: 98.5 (25 Jan 2018 07:30)  HR: 103 (26 Jan 2018 06:44) (85 - 108)  BP: 101/67 (26 Jan 2018 06:44) (94/48 - 117/61)  BP(mean): --  RR: 17 (26 Jan 2018 04:18) (16 - 17)  SpO2: 96% (26 Jan 2018 04:18) (95% - 100%)    I&O's Detail    25 Jan 2018 07:01  -  26 Jan 2018 07:00  --------------------------------------------------------  IN:    Sodium Chloride 0.9% IV Bolus: 500 mL  Total IN: 500 mL    OUT:    Bulb: 63 mL    Bulb: 50 mL    Bulb: 100 mL    Bulb: 50 mL    Indwelling Catheter - Urethral: 2500 mL    Voided: 1250 mL  Total OUT: 4013 mL    Total NET: -3513 mL              Exam  Left breasts: Incisions clean, dry and intact.  No collections.  No erythema.  Skin viable.    Left breast Flaps:  Soft.  Capillary refill 2-3 seconds.  Viable  Viopitx:  83%    Abdomen:  Incision clean, dry and intact.   No collections.  No erythema.  Skin viable.      Assessment and Plan  DC IVF and improve pain management.  Increase HR possibly due to pain.  Repeat CBC.  Strict I and O  UA and UC for possible UTI  DC vioptix  Ambulate  q4 flap checks  Possible DC later if stable.

## 2018-01-27 ENCOUNTER — TRANSCRIPTION ENCOUNTER (OUTPATIENT)
Age: 48
End: 2018-01-27

## 2018-01-27 VITALS
HEART RATE: 100 BPM | SYSTOLIC BLOOD PRESSURE: 118 MMHG | DIASTOLIC BLOOD PRESSURE: 78 MMHG | RESPIRATION RATE: 18 BRPM | OXYGEN SATURATION: 96 % | TEMPERATURE: 97 F

## 2018-01-27 LAB
CULTURE RESULTS: NO GROWTH — SIGNIFICANT CHANGE UP
SPECIMEN SOURCE: SIGNIFICANT CHANGE UP

## 2018-01-27 RX ADMIN — Medication 100 MILLIGRAM(S): at 05:06

## 2018-01-27 RX ADMIN — Medication 400 MILLIGRAM(S): at 01:04

## 2018-01-27 RX ADMIN — Medication 975 MILLIGRAM(S): at 01:04

## 2018-01-27 RX ADMIN — OXYCODONE HYDROCHLORIDE 10 MILLIGRAM(S): 5 TABLET ORAL at 04:37

## 2018-01-27 RX ADMIN — Medication 975 MILLIGRAM(S): at 01:30

## 2018-01-27 RX ADMIN — Medication 400 MILLIGRAM(S): at 01:30

## 2018-01-27 RX ADMIN — Medication 400 MILLIGRAM(S): at 09:16

## 2018-01-27 RX ADMIN — Medication 975 MILLIGRAM(S): at 10:42

## 2018-01-27 RX ADMIN — OXYCODONE HYDROCHLORIDE 10 MILLIGRAM(S): 5 TABLET ORAL at 05:45

## 2018-01-27 RX ADMIN — HEPARIN SODIUM 5000 UNIT(S): 5000 INJECTION INTRAVENOUS; SUBCUTANEOUS at 05:06

## 2018-01-27 NOTE — PROGRESS NOTE ADULT - SUBJECTIVE AND OBJECTIVE BOX
Ms. Kearns is POD#3 s/p L modified radical mastectomy with KAREEM flap reconstruction. She reports that the pain is much improved and she feels ready to go home. No issues.    Vital Signs Last 24 Hrs  T(C): 36.3 (2018 04:35), Max: 36.8 (2018 13:02)  T(F): 97.4 (2018 04:35), Max: 98.2 (2018 13:02)  HR: 100 (2018 04:35) (92 - 100)  BP: 118/78 (2018 04:35) (108/69 - 122/71)  BP(mean): --  RR: 18 (2018 04:35) (16 - 18)  SpO2: 96% (2018 04:35) (96% - 97%)    I&O's Detail    2018 07:01  -  2018 07:00  --------------------------------------------------------  IN:    Oral Fluid: 480 mL  Total IN: 480 mL    OUT:    Bulb: 30 mL    Bulb: 40 mL    Bulb: 25 mL    Bulb: 40 mL    Voided: 500 mL  Total OUT: 635 mL    Total NET: -155 mL      2018 07:01  -  2018 10:16  --------------------------------------------------------  IN:    Oral Fluid: 240 mL  Total IN: 240 mL    OUT:    Voided: 500 mL  Total OUT: 500 mL    Total NET: -260 mL      Physical exam:  NAD  AAO  L chest - skin flap viable, mild ecchymosis in upper lateral breast, no hematoma, NAC slightly darker in upper outer aspect, KAREEM flap warm and viable, YESENIA serosanguinous                          9.8    6.4   )-----------( 186      ( 2018 12:07 )             29.7     Urinalysis Basic - ( 2018 10:20 )    Color: Yellow / Appearance: Clear / S.010 / pH: x  Gluc: x / Ketone: Negative  / Bili: Negative / Urobili: Negative mg/dL   Blood: x / Protein: Negative mg/dL / Nitrite: Negative   Leuk Esterase: Negative / RBC: Negative /HPF / WBC 0-2   Sq Epi: x / Non Sq Epi: Few / Bacteria: x

## 2018-01-27 NOTE — PROGRESS NOTE ADULT - ASSESSMENT
Ms. Kearns is POD#2 s/p L modified radical mastectomy with KAREEM flap reconstruction.  - monitor HR - check CBC, use of oral pain medication encouraged  - check UA, urine culture  - possible home this afternoon or tomorrow
Ms. Kearns is POD#3 s/p L modified radical mastectomy with KAREEM flap reconstruction.  - home today  - follow-up with Petros Baez and Johan next week
Stable postop day # 1.  Advance diet as tolerated.  Analgesia as needed.  Social service consult.

## 2018-01-27 NOTE — GOALS OF CARE CONVERSATION - PERSONAL ADVANCE DIRECTIVE - CONVERSATION DETAILS
Discharge education provided to patient and . Drain care supplies along with support services, community resources, survivorship care plan and my contact information provided to patient. Drain care teaching was done with the  and he provided a positive return demonstration to the district RN. Exercises were demonstrated and discussed. I will continue to follow.

## 2018-01-27 NOTE — DISCHARGE NOTE ADULT - NS AS ACTIVITY OBS
Showering allowed/Walking-Indoors allowed/No Heavy lifting/straining/Do not make important decisions/Do not drive or operate machinery/Walking-Outdoors allowed/Stairs allowed

## 2018-01-27 NOTE — DISCHARGE NOTE ADULT - CARE PROVIDERS DIRECT ADDRESSES
,DirectAddress_Unknown,[email or web address]

## 2018-01-27 NOTE — DISCHARGE NOTE ADULT - PATIENT PORTAL LINK FT
“You can access the Follow[facility] Patient Portal, offered by [facility], by registering with the following website: [email or web address]”

## 2018-01-27 NOTE — PROGRESS NOTE ADULT - SUBJECTIVE AND OBJECTIVE BOX
Doing well  Pain controlled        MEDICATIONS  (STANDING):  acetaminophen   Tablet. 975 milliGRAM(s) Oral every 8 hours  docusate sodium 100 milliGRAM(s) Oral three times a day  heparin  Injectable 5000 Unit(s) SubCutaneous every 12 hours  ibuprofen  Tablet 400 milliGRAM(s) Oral every 6 hours    MEDICATIONS  (PRN):  HYDROmorphone  Injectable 0.5 milliGRAM(s) IV Push every 4 hours PRN Severe Pain (7 - 10)  oxyCODONE    IR 5 milliGRAM(s) Oral every 4 hours PRN Mild Pain (1 - 3)  oxyCODONE    IR 10 milliGRAM(s) Oral every 4 hours PRN Severe Pain (7 - 10)      Vital Signs Last 24 Hrs  T(C): 36.3 (27 Jan 2018 04:35), Max: 36.8 (26 Jan 2018 09:34)  T(F): 97.4 (27 Jan 2018 04:35), Max: 98.2 (26 Jan 2018 09:34)  HR: 100 (27 Jan 2018 04:35) (92 - 104)  BP: 118/78 (27 Jan 2018 04:35) (108/69 - 122/71)  BP(mean): --  RR: 18 (27 Jan 2018 04:35) (16 - 18)  SpO2: 96% (27 Jan 2018 04:35) (96% - 98%)    I&O's Detail    26 Jan 2018 07:01  -  27 Jan 2018 07:00  --------------------------------------------------------  IN:    Oral Fluid: 480 mL  Total IN: 480 mL    OUT:    Bulb: 30 mL    Bulb: 40 mL    Bulb: 25 mL    Bulb: 40 mL    Voided: 500 mL  Total OUT: 635 mL    Total NET: -155 mL              Exam  Left breasts: Incisions clean, dry and intact.  No collections.  No erythema.  Skin viable.    Left Flaps:  Soft.  Capillary refill 2-3 seconds.  Viable    Abdomen:  Incision clean, dry and intact.   No collections.  No erythema.  Skin viable.      Assessment and Plan    DC home  Drain care teaching   Has Rx at home  Follow up Tuesday

## 2018-01-27 NOTE — DISCHARGE NOTE ADULT - MEDICATION SUMMARY - MEDICATIONS TO TAKE
I will START or STAY ON the medications listed below when I get home from the hospital:    Tylenol 500 mg oral tablet  -- 1000 mg by mouth every 8 hours  -- Indication: For pain    ibuprofen 100 mg oral tablet  -- 400 mg by mouth every 8 hours.  Take with food.  -- Indication: For pain    oxyCODONE 5 mg oral tablet  -- 1-2 tabs by mouth every 4 hours as need for pain.  -- Indication: For pain    LORazepam 1 mg oral tablet  -- 1 tab(s) by mouth 3 times a day, As Needed  -- Indication: For Anxiety    nizatidine  -- 150  by mouth 2 times a day  -- Indication: For refulx    omeprazole 20 mg oral delayed release capsule  -- 1 cap(s) by mouth once a day  -- Indication: For reflux

## 2018-01-27 NOTE — DISCHARGE NOTE ADULT - CARE PROVIDER_API CALL
Marcelino Baez), Plastic Surgery; Surgery  [address]  [address]  [place] [address]  Phone: [phone]  Fax: [phone]    Kaci Prado), Surgery  [address]  [place] [address]  Phone: [phone]  Fax: [phone]

## 2018-01-30 LAB — SURGICAL PATHOLOGY FINAL REPORT - CH: SIGNIFICANT CHANGE UP

## 2018-02-01 DIAGNOSIS — C50.912 MALIGNANT NEOPLASM OF UNSPECIFIED SITE OF LEFT FEMALE BREAST: ICD-10-CM

## 2018-02-01 DIAGNOSIS — C77.3 SECONDARY AND UNSPECIFIED MALIGNANT NEOPLASM OF AXILLA AND UPPER LIMB LYMPH NODES: ICD-10-CM

## 2018-02-01 DIAGNOSIS — C50.812 MALIGNANT NEOPLASM OF OVERLAPPING SITES OF LEFT FEMALE BREAST: ICD-10-CM

## 2018-02-01 DIAGNOSIS — K57.90 DIVERTICULOSIS OF INTESTINE, PART UNSPECIFIED, WITHOUT PERFORATION OR ABSCESS WITHOUT BLEEDING: ICD-10-CM

## 2018-02-05 PROBLEM — C50.812 MALIGNANT NEOPLASM OF OVERLAPPING SITES OF LEFT BREAST IN FEMALE, ESTROGEN RECEPTOR NEGATIVE: Status: ACTIVE | Noted: 2017-08-10

## 2018-02-06 ENCOUNTER — APPOINTMENT (OUTPATIENT)
Dept: BREAST CENTER | Facility: CLINIC | Age: 48
End: 2018-02-06
Payer: COMMERCIAL

## 2018-02-06 DIAGNOSIS — Z17.1 MALIGNANT NEOPLASM OF OVERLAPPING SITES OF LEFT FEMALE BREAST: ICD-10-CM

## 2018-02-06 DIAGNOSIS — C50.812 MALIGNANT NEOPLASM OF OVERLAPPING SITES OF LEFT FEMALE BREAST: ICD-10-CM

## 2018-02-06 PROCEDURE — 99024 POSTOP FOLLOW-UP VISIT: CPT

## 2018-03-15 ENCOUNTER — MOBILE ON CALL (OUTPATIENT)
Age: 48
End: 2018-03-15

## 2018-06-14 ENCOUNTER — APPOINTMENT (OUTPATIENT)
Dept: BREAST CENTER | Facility: CLINIC | Age: 48
End: 2018-06-14
Payer: COMMERCIAL

## 2018-06-14 VITALS
HEIGHT: 66 IN | WEIGHT: 143 LBS | SYSTOLIC BLOOD PRESSURE: 122 MMHG | HEART RATE: 76 BPM | DIASTOLIC BLOOD PRESSURE: 70 MMHG | BODY MASS INDEX: 22.98 KG/M2

## 2018-06-14 PROCEDURE — 99214 OFFICE O/P EST MOD 30 MIN: CPT

## 2018-06-14 RX ORDER — CEFADROXIL 500 MG/1
500 CAPSULE ORAL
Qty: 14 | Refills: 0 | Status: DISCONTINUED | COMMUNITY
Start: 2018-01-16

## 2018-06-14 RX ORDER — OXYCODONE 5 MG/1
5 TABLET ORAL
Qty: 30 | Refills: 0 | Status: DISCONTINUED | COMMUNITY
Start: 2018-01-16

## 2018-07-17 PROBLEM — C50.919 MALIGNANT NEOPLASM OF UNSPECIFIED SITE OF UNSPECIFIED FEMALE BREAST: Chronic | Status: ACTIVE | Noted: 2018-01-16

## 2018-08-15 ENCOUNTER — FORM ENCOUNTER (OUTPATIENT)
Age: 48
End: 2018-08-15

## 2018-08-16 ENCOUNTER — APPOINTMENT (OUTPATIENT)
Dept: MAMMOGRAPHY | Facility: CLINIC | Age: 48
End: 2018-08-16
Payer: COMMERCIAL

## 2018-08-16 ENCOUNTER — OUTPATIENT (OUTPATIENT)
Dept: OUTPATIENT SERVICES | Facility: HOSPITAL | Age: 48
LOS: 1 days | End: 2018-08-16
Payer: COMMERCIAL

## 2018-08-16 ENCOUNTER — APPOINTMENT (OUTPATIENT)
Dept: ULTRASOUND IMAGING | Facility: CLINIC | Age: 48
End: 2018-08-16
Payer: COMMERCIAL

## 2018-08-16 DIAGNOSIS — Z00.8 ENCOUNTER FOR OTHER GENERAL EXAMINATION: ICD-10-CM

## 2018-08-16 DIAGNOSIS — Z90.89 ACQUIRED ABSENCE OF OTHER ORGANS: Chronic | ICD-10-CM

## 2018-08-16 DIAGNOSIS — Z98.891 HISTORY OF UTERINE SCAR FROM PREVIOUS SURGERY: Chronic | ICD-10-CM

## 2018-08-16 PROCEDURE — 76641 ULTRASOUND BREAST COMPLETE: CPT | Mod: 26,RT

## 2018-08-16 PROCEDURE — G0279: CPT | Mod: 26

## 2018-08-16 PROCEDURE — 76641 ULTRASOUND BREAST COMPLETE: CPT

## 2018-08-16 PROCEDURE — 77065 DX MAMMO INCL CAD UNI: CPT | Mod: 26,RT

## 2018-08-16 PROCEDURE — G0279: CPT

## 2018-08-16 PROCEDURE — 77065 DX MAMMO INCL CAD UNI: CPT

## 2018-08-30 ENCOUNTER — INPATIENT (INPATIENT)
Facility: HOSPITAL | Age: 48
LOS: 1 days | Discharge: HOME | End: 2018-09-01
Attending: INTERNAL MEDICINE | Admitting: INTERNAL MEDICINE
Payer: COMMERCIAL

## 2018-08-30 VITALS
TEMPERATURE: 98 F | WEIGHT: 147.93 LBS | SYSTOLIC BLOOD PRESSURE: 128 MMHG | HEART RATE: 101 BPM | HEIGHT: 66 IN | DIASTOLIC BLOOD PRESSURE: 89 MMHG

## 2018-08-30 DIAGNOSIS — Z98.891 HISTORY OF UTERINE SCAR FROM PREVIOUS SURGERY: Chronic | ICD-10-CM

## 2018-08-30 DIAGNOSIS — Z90.89 ACQUIRED ABSENCE OF OTHER ORGANS: Chronic | ICD-10-CM

## 2018-08-30 PROCEDURE — 71045 X-RAY EXAM CHEST 1 VIEW: CPT | Mod: 26

## 2018-08-30 PROCEDURE — 93010 ELECTROCARDIOGRAM REPORT: CPT

## 2018-08-30 RX ORDER — SODIUM CHLORIDE 9 MG/ML
3 INJECTION INTRAMUSCULAR; INTRAVENOUS; SUBCUTANEOUS ONCE
Qty: 0 | Refills: 0 | Status: COMPLETED | OUTPATIENT
Start: 2018-08-30 | End: 2018-08-30

## 2018-08-30 RX ORDER — KETOROLAC TROMETHAMINE 30 MG/ML
30 SYRINGE (ML) INJECTION ONCE
Qty: 0 | Refills: 0 | Status: DISCONTINUED | OUTPATIENT
Start: 2018-08-30 | End: 2018-08-30

## 2018-08-30 RX ORDER — ONDANSETRON 8 MG/1
4 TABLET, FILM COATED ORAL ONCE
Qty: 0 | Refills: 0 | Status: COMPLETED | OUTPATIENT
Start: 2018-08-30 | End: 2018-08-30

## 2018-08-30 RX ORDER — MORPHINE SULFATE 50 MG/1
2 CAPSULE, EXTENDED RELEASE ORAL
Qty: 0 | Refills: 0 | Status: DISCONTINUED | OUTPATIENT
Start: 2018-08-30 | End: 2018-08-31

## 2018-08-30 RX ORDER — SODIUM CHLORIDE 9 MG/ML
1000 INJECTION INTRAMUSCULAR; INTRAVENOUS; SUBCUTANEOUS ONCE
Qty: 0 | Refills: 0 | Status: COMPLETED | OUTPATIENT
Start: 2018-08-30 | End: 2018-08-30

## 2018-08-30 RX ADMIN — SODIUM CHLORIDE 3 MILLILITER(S): 9 INJECTION INTRAMUSCULAR; INTRAVENOUS; SUBCUTANEOUS at 23:50

## 2018-08-30 NOTE — ED ADULT TRIAGE NOTE - CHIEF COMPLAINT QUOTE
pt presents to ED with complaints of left chest, left neck, and left shoulder pain. pt states pain began at approximately 1800 this evening. pt states pain has been progressively worsening and is now 10/10.  pt has hx of left breast CA with Left Mastectomy in Jan 2018. pt sent by MD Cadena.

## 2018-08-31 DIAGNOSIS — R07.81 PLEURODYNIA: ICD-10-CM

## 2018-08-31 DIAGNOSIS — C50.112 MALIGNANT NEOPLASM OF CENTRAL PORTION OF LEFT FEMALE BREAST: ICD-10-CM

## 2018-08-31 DIAGNOSIS — R06.02 SHORTNESS OF BREATH: ICD-10-CM

## 2018-08-31 DIAGNOSIS — M54.2 CERVICALGIA: ICD-10-CM

## 2018-08-31 LAB
ADD ON TEST-SPECIMEN IN LAB: SIGNIFICANT CHANGE UP
ALBUMIN SERPL ELPH-MCNC: 4.4 G/DL — SIGNIFICANT CHANGE UP (ref 3.3–5)
ALP SERPL-CCNC: 95 U/L — SIGNIFICANT CHANGE UP (ref 40–120)
ALT FLD-CCNC: 30 U/L — SIGNIFICANT CHANGE UP (ref 12–78)
ANION GAP SERPL CALC-SCNC: 6 MMOL/L — SIGNIFICANT CHANGE UP (ref 5–17)
ANION GAP SERPL CALC-SCNC: 7 MMOL/L — SIGNIFICANT CHANGE UP (ref 5–17)
APTT BLD: 32 SEC — SIGNIFICANT CHANGE UP (ref 27.5–37.4)
AST SERPL-CCNC: 21 U/L — SIGNIFICANT CHANGE UP (ref 15–37)
BASOPHILS # BLD AUTO: 0.02 K/UL — SIGNIFICANT CHANGE UP (ref 0–0.2)
BASOPHILS NFR BLD AUTO: 0.3 % — SIGNIFICANT CHANGE UP (ref 0–2)
BILIRUB SERPL-MCNC: 0.6 MG/DL — SIGNIFICANT CHANGE UP (ref 0.2–1.2)
BUN SERPL-MCNC: 14 MG/DL — SIGNIFICANT CHANGE UP (ref 7–23)
BUN SERPL-MCNC: 18 MG/DL — SIGNIFICANT CHANGE UP (ref 7–23)
CALCIUM SERPL-MCNC: 9.3 MG/DL — SIGNIFICANT CHANGE UP (ref 8.5–10.1)
CALCIUM SERPL-MCNC: 9.4 MG/DL — SIGNIFICANT CHANGE UP (ref 8.5–10.1)
CHLORIDE SERPL-SCNC: 105 MMOL/L — SIGNIFICANT CHANGE UP (ref 96–108)
CHLORIDE SERPL-SCNC: 108 MMOL/L — SIGNIFICANT CHANGE UP (ref 96–108)
CK SERPL-CCNC: 57 U/L — SIGNIFICANT CHANGE UP (ref 26–192)
CK SERPL-CCNC: 84 U/L — SIGNIFICANT CHANGE UP (ref 26–192)
CO2 SERPL-SCNC: 28 MMOL/L — SIGNIFICANT CHANGE UP (ref 22–31)
CO2 SERPL-SCNC: 29 MMOL/L — SIGNIFICANT CHANGE UP (ref 22–31)
CREAT SERPL-MCNC: 0.76 MG/DL — SIGNIFICANT CHANGE UP (ref 0.5–1.3)
CREAT SERPL-MCNC: 0.84 MG/DL — SIGNIFICANT CHANGE UP (ref 0.5–1.3)
EOSINOPHIL # BLD AUTO: 0.08 K/UL — SIGNIFICANT CHANGE UP (ref 0–0.5)
EOSINOPHIL NFR BLD AUTO: 1.3 % — SIGNIFICANT CHANGE UP (ref 0–6)
ERYTHROCYTE [SEDIMENTATION RATE] IN BLOOD: 7 MM/HR — SIGNIFICANT CHANGE UP (ref 0–20)
GLUCOSE SERPL-MCNC: 103 MG/DL — HIGH (ref 70–99)
GLUCOSE SERPL-MCNC: 108 MG/DL — HIGH (ref 70–99)
HCT VFR BLD CALC: 38.5 % — SIGNIFICANT CHANGE UP (ref 34.5–45)
HCT VFR BLD CALC: 40.7 % — SIGNIFICANT CHANGE UP (ref 34.5–45)
HGB BLD-MCNC: 13.5 G/DL — SIGNIFICANT CHANGE UP (ref 11.5–15.5)
HGB BLD-MCNC: 14.2 G/DL — SIGNIFICANT CHANGE UP (ref 11.5–15.5)
IMM GRANULOCYTES NFR BLD AUTO: 0.3 % — SIGNIFICANT CHANGE UP (ref 0–1.5)
INR BLD: 1.02 RATIO — SIGNIFICANT CHANGE UP (ref 0.88–1.16)
LIDOCAIN IGE QN: 76 U/L — SIGNIFICANT CHANGE UP (ref 73–393)
LYMPHOCYTES # BLD AUTO: 0.7 K/UL — LOW (ref 1–3.3)
LYMPHOCYTES # BLD AUTO: 11.7 % — LOW (ref 13–44)
MCHC RBC-ENTMCNC: 34.9 GM/DL — SIGNIFICANT CHANGE UP (ref 32–36)
MCHC RBC-ENTMCNC: 35.1 GM/DL — SIGNIFICANT CHANGE UP (ref 32–36)
MCHC RBC-ENTMCNC: 35.7 PG — HIGH (ref 27–34)
MCHC RBC-ENTMCNC: 36 PG — HIGH (ref 27–34)
MCV RBC AUTO: 102.3 FL — HIGH (ref 80–100)
MCV RBC AUTO: 102.7 FL — HIGH (ref 80–100)
MONOCYTES # BLD AUTO: 0.77 K/UL — SIGNIFICANT CHANGE UP (ref 0–0.9)
MONOCYTES NFR BLD AUTO: 12.9 % — SIGNIFICANT CHANGE UP (ref 2–14)
NEUTROPHILS # BLD AUTO: 4.38 K/UL — SIGNIFICANT CHANGE UP (ref 1.8–7.4)
NEUTROPHILS NFR BLD AUTO: 73.5 % — SIGNIFICANT CHANGE UP (ref 43–77)
NRBC # BLD: 0 /100 WBCS — SIGNIFICANT CHANGE UP (ref 0–0)
NRBC # BLD: 0 /100 WBCS — SIGNIFICANT CHANGE UP (ref 0–0)
NT-PROBNP SERPL-SCNC: 40 PG/ML — SIGNIFICANT CHANGE UP (ref 0–125)
PLATELET # BLD AUTO: 157 K/UL — SIGNIFICANT CHANGE UP (ref 150–400)
PLATELET # BLD AUTO: 189 K/UL — SIGNIFICANT CHANGE UP (ref 150–400)
POTASSIUM SERPL-MCNC: 3.4 MMOL/L — LOW (ref 3.5–5.3)
POTASSIUM SERPL-MCNC: 4.1 MMOL/L — SIGNIFICANT CHANGE UP (ref 3.5–5.3)
POTASSIUM SERPL-SCNC: 3.4 MMOL/L — LOW (ref 3.5–5.3)
POTASSIUM SERPL-SCNC: 4.1 MMOL/L — SIGNIFICANT CHANGE UP (ref 3.5–5.3)
PROT SERPL-MCNC: 7.5 GM/DL — SIGNIFICANT CHANGE UP (ref 6–8.3)
PROTHROM AB SERPL-ACNC: 11 SEC — SIGNIFICANT CHANGE UP (ref 9.8–12.7)
RBC # BLD: 3.75 M/UL — LOW (ref 3.8–5.2)
RBC # BLD: 3.98 M/UL — SIGNIFICANT CHANGE UP (ref 3.8–5.2)
RBC # FLD: 13.2 % — SIGNIFICANT CHANGE UP (ref 10.3–14.5)
RBC # FLD: 13.3 % — SIGNIFICANT CHANGE UP (ref 10.3–14.5)
SODIUM SERPL-SCNC: 141 MMOL/L — SIGNIFICANT CHANGE UP (ref 135–145)
SODIUM SERPL-SCNC: 142 MMOL/L — SIGNIFICANT CHANGE UP (ref 135–145)
TROPONIN I SERPL-MCNC: <0.015 NG/ML — SIGNIFICANT CHANGE UP (ref 0.01–0.04)
WBC # BLD: 5.97 K/UL — SIGNIFICANT CHANGE UP (ref 3.8–10.5)
WBC # BLD: 6.6 K/UL — SIGNIFICANT CHANGE UP (ref 3.8–10.5)
WBC # FLD AUTO: 5.97 K/UL — SIGNIFICANT CHANGE UP (ref 3.8–10.5)
WBC # FLD AUTO: 6.6 K/UL — SIGNIFICANT CHANGE UP (ref 3.8–10.5)

## 2018-08-31 PROCEDURE — 93010 ELECTROCARDIOGRAM REPORT: CPT

## 2018-08-31 PROCEDURE — 93306 TTE W/DOPPLER COMPLETE: CPT | Mod: 26

## 2018-08-31 PROCEDURE — 99254 IP/OBS CNSLTJ NEW/EST MOD 60: CPT

## 2018-08-31 PROCEDURE — 71275 CT ANGIOGRAPHY CHEST: CPT | Mod: 26

## 2018-08-31 PROCEDURE — 99285 EMERGENCY DEPT VISIT HI MDM: CPT

## 2018-08-31 RX ORDER — POTASSIUM CHLORIDE 20 MEQ
40 PACKET (EA) ORAL ONCE
Qty: 0 | Refills: 0 | Status: COMPLETED | OUTPATIENT
Start: 2018-08-31 | End: 2018-08-31

## 2018-08-31 RX ORDER — MORPHINE SULFATE 50 MG/1
4 CAPSULE, EXTENDED RELEASE ORAL EVERY 6 HOURS
Qty: 0 | Refills: 0 | Status: DISCONTINUED | OUTPATIENT
Start: 2018-08-31 | End: 2018-09-01

## 2018-08-31 RX ORDER — IBUPROFEN 200 MG
600 TABLET ORAL EVERY 6 HOURS
Qty: 0 | Refills: 0 | Status: DISCONTINUED | OUTPATIENT
Start: 2018-08-31 | End: 2018-09-01

## 2018-08-31 RX ORDER — ASPIRIN/CALCIUM CARB/MAGNESIUM 324 MG
81 TABLET ORAL DAILY
Qty: 0 | Refills: 0 | Status: DISCONTINUED | OUTPATIENT
Start: 2018-08-31 | End: 2018-09-01

## 2018-08-31 RX ORDER — MORPHINE SULFATE 50 MG/1
4 CAPSULE, EXTENDED RELEASE ORAL ONCE
Qty: 0 | Refills: 0 | Status: DISCONTINUED | OUTPATIENT
Start: 2018-08-31 | End: 2018-08-31

## 2018-08-31 RX ORDER — ACETAMINOPHEN 500 MG
650 TABLET ORAL EVERY 8 HOURS
Qty: 0 | Refills: 0 | Status: DISCONTINUED | OUTPATIENT
Start: 2018-08-31 | End: 2018-09-01

## 2018-08-31 RX ORDER — ACETAMINOPHEN 500 MG
1000 TABLET ORAL ONCE
Qty: 0 | Refills: 0 | Status: COMPLETED | OUTPATIENT
Start: 2018-08-31 | End: 2018-08-31

## 2018-08-31 RX ORDER — POTASSIUM CHLORIDE 20 MEQ
20 PACKET (EA) ORAL ONCE
Qty: 0 | Refills: 0 | Status: COMPLETED | OUTPATIENT
Start: 2018-08-31 | End: 2018-08-31

## 2018-08-31 RX ORDER — PANTOPRAZOLE SODIUM 20 MG/1
40 TABLET, DELAYED RELEASE ORAL
Qty: 0 | Refills: 0 | Status: DISCONTINUED | OUTPATIENT
Start: 2018-08-31 | End: 2018-09-01

## 2018-08-31 RX ORDER — OXYCODONE HYDROCHLORIDE 5 MG/1
0 TABLET ORAL
Qty: 0 | Refills: 0 | COMMUNITY

## 2018-08-31 RX ORDER — ENOXAPARIN SODIUM 100 MG/ML
40 INJECTION SUBCUTANEOUS EVERY 24 HOURS
Qty: 0 | Refills: 0 | Status: DISCONTINUED | OUTPATIENT
Start: 2018-08-31 | End: 2018-09-01

## 2018-08-31 RX ORDER — OMEPRAZOLE 10 MG/1
1 CAPSULE, DELAYED RELEASE ORAL
Qty: 0 | Refills: 0 | COMMUNITY

## 2018-08-31 RX ORDER — NIZATIDINE 150 MG/1
150 CAPSULE ORAL
Qty: 0 | Refills: 0 | COMMUNITY

## 2018-08-31 RX ORDER — SODIUM CHLORIDE 9 MG/ML
1000 INJECTION INTRAMUSCULAR; INTRAVENOUS; SUBCUTANEOUS
Qty: 0 | Refills: 0 | Status: DISCONTINUED | OUTPATIENT
Start: 2018-08-31 | End: 2018-09-01

## 2018-08-31 RX ORDER — IBUPROFEN 200 MG
0 TABLET ORAL
Qty: 0 | Refills: 0 | COMMUNITY

## 2018-08-31 RX ORDER — ACETAMINOPHEN 500 MG
2 TABLET ORAL
Qty: 0 | Refills: 0 | COMMUNITY

## 2018-08-31 RX ADMIN — Medication 20 MILLIEQUIVALENT(S): at 12:06

## 2018-08-31 RX ADMIN — Medication 400 MILLIGRAM(S): at 01:02

## 2018-08-31 RX ADMIN — Medication 30 MILLIGRAM(S): at 00:56

## 2018-08-31 RX ADMIN — Medication 81 MILLIGRAM(S): at 12:06

## 2018-08-31 RX ADMIN — ENOXAPARIN SODIUM 40 MILLIGRAM(S): 100 INJECTION SUBCUTANEOUS at 08:57

## 2018-08-31 RX ADMIN — SODIUM CHLORIDE 1000 MILLILITER(S): 9 INJECTION INTRAMUSCULAR; INTRAVENOUS; SUBCUTANEOUS at 00:00

## 2018-08-31 RX ADMIN — ONDANSETRON 4 MILLIGRAM(S): 8 TABLET, FILM COATED ORAL at 00:02

## 2018-08-31 RX ADMIN — Medication 30 MILLIGRAM(S): at 00:03

## 2018-08-31 RX ADMIN — Medication 1000 MILLIGRAM(S): at 02:15

## 2018-08-31 RX ADMIN — Medication 600 MILLIGRAM(S): at 17:47

## 2018-08-31 RX ADMIN — MORPHINE SULFATE 4 MILLIGRAM(S): 50 CAPSULE, EXTENDED RELEASE ORAL at 12:06

## 2018-08-31 RX ADMIN — SODIUM CHLORIDE 1000 MILLILITER(S): 9 INJECTION INTRAMUSCULAR; INTRAVENOUS; SUBCUTANEOUS at 00:55

## 2018-08-31 RX ADMIN — MORPHINE SULFATE 4 MILLIGRAM(S): 50 CAPSULE, EXTENDED RELEASE ORAL at 08:58

## 2018-08-31 RX ADMIN — MORPHINE SULFATE 2 MILLIGRAM(S): 50 CAPSULE, EXTENDED RELEASE ORAL at 00:55

## 2018-08-31 RX ADMIN — MORPHINE SULFATE 2 MILLIGRAM(S): 50 CAPSULE, EXTENDED RELEASE ORAL at 00:01

## 2018-08-31 RX ADMIN — Medication 40 MILLIEQUIVALENT(S): at 08:57

## 2018-08-31 RX ADMIN — MORPHINE SULFATE 4 MILLIGRAM(S): 50 CAPSULE, EXTENDED RELEASE ORAL at 13:45

## 2018-08-31 RX ADMIN — MORPHINE SULFATE 4 MILLIGRAM(S): 50 CAPSULE, EXTENDED RELEASE ORAL at 12:07

## 2018-08-31 NOTE — H&P ADULT - MS EXT PE MLT D E PC
Mikhail Landry, :1965    CONSENT FOR PROCEDURE/SEDATION    1. I authorize the performance upon Mikhail Landry  the following: Endometrial Biopsy    2.  I authorize Dr. Marcella Soni MD (and whomever is designated as the doctor’s assistant), to perform Witness: _________________________________________ Date:___________     Physician Signature: _______________________________ Date:___________ no cyanosis/no pedal edema

## 2018-08-31 NOTE — PATIENT PROFILE ADULT. - REASON FOR ADMISSION
Began to have Left shoulder pain radiating to neck and then chest a few hours after getting 10th dose of radiation

## 2018-08-31 NOTE — H&P ADULT - HISTORY OF PRESENT ILLNESS
Pt. is a 49 yo F with a hx of breast cancer s/p mastectomy, chemo, and 10 sessions of radiation therapy (last radiation 8/30 at 10am) BIB  for chest pain, upper back pain, left sided neck and shoulder pain and trouble breathing.  Pain is worse with laughing, talking, breathing.  Patient never had these symptoms after radiation before.  Patient also c/o cramp in left calf a few nights ago that felt like it travelled up leg and then disappeared.  Patient denies fever, cough, vomiting or diarrhea. Pain occurred gradually as patient was running errands yesterday.    PSHx:  Mastectomy    Family hx:  Father: No CAD, No CVA  Mother: No CAV. No CVA     PmHx:  Breast cancer

## 2018-08-31 NOTE — CONSULT NOTE ADULT - SUBJECTIVE AND OBJECTIVE BOX
HPI:  Pt. is a 47 yo F with a hx of triple negative breast cancer diagnosed . Mrs. Kearns treated with chemo and had left mastectomy in .  Has now had 10 sessions of radiation therapy (last radiation  at 10am) BIB  for chest pain, upper back pain, left sided neck and shoulder pain and trouble breathing.  Pain is worse with laughing, talking, breathing.  Patient never had these symptoms after radiation before.  Patient also c/o cramp in left calf a few nights ago that felt like it travelled up leg and then disappeared.  Patient denies fever, cough, vomiting or diarrhea. Pain occurred gradually as patient was running errands yesterday. CTA negative for pulmomary embolism. No evidence radiation pneumonitis.    PSHx:  Mastectomy    Family hx:  Father: No CAD, No CVA  Mother: No CAV. No CVA     PmHx:  Breast cancer (31 Aug 2018 05:18)      PAST MEDICAL & SURGICAL HISTORY:  Breast cancer: left  breast, completed chemotherapy 2018  History of  section: ,   History of tonsillectomy      MEDICATIONS  (STANDING):  aspirin enteric coated 81 milliGRAM(s) Oral daily  enoxaparin Injectable 40 milliGRAM(s) SubCutaneous every 24 hours  pantoprazole    Tablet 40 milliGRAM(s) Oral before breakfast    MEDICATIONS  (PRN):  acetaminophen   Tablet 650 milliGRAM(s) Oral every 8 hours PRN For Temp greater than 38 C (100.4 F)  acetaminophen   Tablet. 650 milliGRAM(s) Oral every 8 hours PRN Mild Pain (1 - 3)  morphine  - Injectable 4 milliGRAM(s) IV Push every 6 hours PRN Severe Pain (7 - 10)      Allergies    No Known Allergies    Intolerances        SOCIAL HISTORY: Denies tobacco, etoh abuse or illicit drug use    FAMILY HISTORY:  No pertinent family history in first degree relatives      Vital Signs Last 24 Hrs  T(C): 36.6 (31 Aug 2018 08:46), Max: 36.6 (31 Aug 2018 05:57)  T(F): 97.9 (31 Aug 2018 08:46), Max: 97.9 (31 Aug 2018 05:57)  HR: 108 (31 Aug 2018 08:46) (95 - 108)  BP: 113/71 (31 Aug 2018 08:46) (94/64 - 128/89)  BP(mean): --  RR: 18 (31 Aug 2018 08:46) (18 - 19)  SpO2: 96% (31 Aug 2018 08:46) (96% - 100%)    REVIEW OF SYSTEMS:    CONSTITUTIONAL:  As per HPI.  SKIN: no rashes  HEENT:  Eyes:  No diplopia or blurred vision. ENT:  No earache, sore throat or runny nose.  CARDIOVASCULAR:  No pressure, squeezing, tightness, heaviness or aching about the chest, neck, axilla or epigastrium.  RESPIRATORY: sob, chest pain  GASTROINTESTINAL:  No nausea, vomiting or diarrhea.  GENITOURINARY:  No dysuria, frequency or urgency.  MUSCULOSKELETAL:  As per HPI.  SKIN:  No change in skin, hair or nails.  NEUROLOGIC:  No paresthesias, fasciculations, seizures or weakness.  PSYCHIATRIC:  No disorder of thought or mood.  ENDOCRINE:  No heat or cold intolerance, polyuria or polydipsia.  HEMATOLOGICAL:  No easy bruising or bleedings:  .     PHYSICAL EXAMINATION:    GENERAL APPEARANCE:  Pt. is not currently dyspneic, in no distress. Pt. is alert, oriented, and pleasant.  HEENT:  Pupils are normal and react normally. No icterus. Mucous membranes well colored.  NECK:  Supple. No lymphadenopathy. Jugular venous pressure not elevated. Carotids equal.   HEART:   The cardiac impulse has a normal quality. Regular. Normal S1 and S2. There are no murmurs, rubs or gallops noted  CHEST:  Chest is clear to auscultation. Normal respiratory effort.  ABDOMEN:  Soft and nontender.   EXTREMITIES:  There is no cyanosis, clubbing or edema.   SKIN:  No rash or significant lesions are noted.    LABS:                        14.2   5.97  )-----------( 189      ( 30 Aug 2018 23:45 )             40.7         141  |  105  |  18  ----------------------------<  103<H>  3.4<L>   |  29  |  0.84    Ca    9.4      30 Aug 2018 23:45    TPro  7.5  /  Alb  4.4  /  TBili  0.6  /  DBili  x   /  AST  21  /  ALT  30  /  AlkPhos  95  08-30    LIVER FUNCTIONS - ( 30 Aug 2018 23:45 )  Alb: 4.4 g/dL / Pro: 7.5 gm/dL / ALK PHOS: 95 U/L / ALT: 30 U/L / AST: 21 U/L / GGT: x           PT/INR - ( 30 Aug 2018 23:45 )   PT: 11.0 sec;   INR: 1.02 ratio         PTT - ( 30 Aug 2018 23:45 )  PTT:32.0 sec  CARDIAC MARKERS ( 31 Aug 2018 07:52 )  <0.015 ng/mL / x     / 57 U/L / x     / x      CARDIAC MARKERS ( 31 Aug 2018 05:06 )  <0.015 ng/mL / x     / x     / x     / x      CARDIAC MARKERS ( 30 Aug 2018 23:45 )  <0.015 ng/mL / x     / 84 U/L / x     / x                RADIOLOGY & ADDITIONAL STUDIES:
Patient is a 48y old  Female who presents with a chief complaint of Began to have Left shoulder pain radiating to neck and then chest a few hours after she  received radiation therapy.      HPI:  Patient is 48-year-old with history of breast cancer status post mastectomy in the past, she has received chemotherapy and has been receiving radiation therapy, yesterday she received radiation therapy and she started experiencing episode of left-sided chest pain, neck pain and this pain radiates to the shoulder. This pain gets worse when she takes a deep breath or when pressure is applied on the chest wall. This pain is not necessarily shortness of breath or diaphoresis. This pain comes and goes. This pain can last for approximately 5-10 minutes and resolved. She denies any cough or any fever. She had one episode of chest pain this morning but with this pain now acute EKG changes were noted. On telemetry she is in normal sinus rhythm and is hemodynamically stable. She has received morphine with that now she is comfortable and asymptomatic. Her echocardiogram demonstrated normal left ventricular ejection fraction, no wall motion abnormalities noted and there is no pericardial effusion.      < from: CT Angio Chest PE Protocol w/ IV Cont (18    FINDINGS:    CHEST:     LUNGS AND LARGE AIRWAYS: Patent central airways. Bilateral lower lobe   atelectasis. Bibasilar interlobular septal thickening and groundglass   opacities may represent mild edema.  PLEURA: No pneumothorax or pleural effusion.  VESSELS: No pulmonary embolus.  HEART: Heart size is normal.No pericardial effusion.  MEDIASTINUM AND LINDA: No lymphadenopathy.  CHEST WALL AND LOWER NECK: Status post left mastectomy . Right anterior   chest wall Mediport with tip in the cavoatrial junction.  VISUALIZED UPPER ABDOMEN: Within normal limits.  BONES: Within normal limits.    IMPRESSION:     No pulmonary embolus.    Bibasilar septal thickening and groundglass opacities may reflect mild   edema.              PAST MEDICAL & SURGICAL HISTORY:  Breast cancer: left  breast, completed chemotherapy 2018  History of  section: ,   History of tonsillectomy          MEDICATIONS  (STANDING):  aspirin enteric coated 81 milliGRAM(s) Oral daily  enoxaparin Injectable 40 milliGRAM(s) SubCutaneous every 24 hours  pantoprazole    Tablet 40 milliGRAM(s) Oral before breakfast    MEDICATIONS  (PRN):  acetaminophen   Tablet 650 milliGRAM(s) Oral every 8 hours PRN For Temp greater than 38 C (100.4 F)  acetaminophen   Tablet. 650 milliGRAM(s) Oral every 8 hours PRN Mild Pain (1 - 3)  morphine  - Injectable 4 milliGRAM(s) IV Push every 6 hours PRN Severe Pain (7 - 10)      FAMILY HISTORY:  No pertinent family history in first degree relatives      SOCIAL HISTORY: She denies any history of smoking or any alcohol consumption.    REVIEW OF SYSTEM:  Pertinent items are noted in HPI.  Constitutional	Negative for chills, fevers, sweats.    Eyes: 	Negative for visual disturbance.  Ears, nose, mouth, throat, and face: Negative for epistaxis, nasal congestion, sore throat and tinnitus.  Neck:	Negative for enlargement, pain and difficulty in swallowing  Respiration : Negative for cough, dyspnea on exertion, pleuritic chest pain and wheezing  Cardiovascular: Negative for chest pain, dyspnea and palpitations    Gastrointestinal : Negative for abdominal pain, diarrhea, nausea and vomiting  Genitourinary: Negative for dysuria, frequency and urinary incontinence .  Skin: Negative for  rash, pruritus, swelling, dryness .  	  Hematologic/lymphatic: Negative for bleeding and easy bruising  Musculoskeletal: Negative for arthralgias, back pain and muscle weakness.  Neurological: Negative for dizziness, headaches, seizures and tremors  Behavioral/Psych: Negative for mood change, depression.  Endocrine:	Negative for blurry vision, polydipsia and polyuria, diaphoresis.   Allergic/Immunologic:	Negative for anaphylaxis, angioedema and urticaria.      Vital Signs Last 24 Hrs  T(C): 36.6 (31 Aug 2018 08:46), Max: 36.6 (31 Aug 2018 05:57)  T(F): 97.9 (31 Aug 2018 08:46), Max: 97.9 (31 Aug 2018 05:57)  HR: 108 (31 Aug 2018 08:46) (95 - 108)  BP: 113/71 (31 Aug 2018 08:46) (94/64 - 128/89)  BP(mean): --  RR: 18 (31 Aug 2018 08:46) (18 - 19)  SpO2: 96% (31 Aug 2018 08:46) (96% - 100%)    I&O's Summary    PHYSICAL EXAM  General Appearance: cooperative, no acute distress,   HEENT: PERRL, conjunctiva clear, EOM's intact .   Neck: Supple, , no adenopathy, thyroid: not enlarged, no carotid bruit or JVDs  Lungs: Clear to auscultation bilateral,no adventitious breath sounds, normal   expiratory phase  Heart: Regular rate and rhythm, S1, S2 normal, no murmur, rub or gallop , she has reproducible chest wall tenderness and also tenderness in the left shoulder area.  Abdomen: Soft, , bowel sounds active , no hepatosplenomegaly , mild epigastric tenderness.  Extremities: no cyanosis or edema, no joint swelling  Skin: Skin color, texture normal, no rashes   Neurologic: Alert and oriented X3 , cranial nerves intact, sensory and motor normal,        INTERPRETATION OF TELEMETRY: NSR     ECG: NSR no acute changes.        LABS:                          14.2   5.97  )-----------( 189      ( 30 Aug 2018 23:45 )             40.7     08-30    141  |  105  |  18  ----------------------------<  103<H>  3.4<L>   |  29  |  0.84    Ca    9.4      30 Aug 2018 23:45    TPro  7.5  /  Alb  4.4  /  TBili  0.6  /  DBili  x   /  AST  21  /  ALT  30  /  AlkPhos  95  08-30    CARDIAC MARKERS ( 31 Aug 2018 07:52 )  <0.015 ng/mL / x     / 57 U/L / x     / x      CARDIAC MARKERS ( 31 Aug 2018 05:06 )  <0.015 ng/mL / x     / x     / x     / x      CARDIAC MARKERS ( 30 Aug 2018 23:45 )  <0.015 ng/mL / x     / 84 U/L / x     / x            Pro BNP  40  @ 23:45  D Dimer  --  @ 23:45    PT/INR - ( 30 Aug 2018 23:45 )   PT: 11.0 sec;   INR: 1.02 ratio         PTT - ( 30 Aug 2018 23:45 )  PTT:32.0 sec
Patient with Ca Breast, S/p Chemo and MRM, mets to regional nodes.  Started on RT to left chest wall and regional nodes, including internal mammary nodes.  So far has received 200 of a planned 5000cGy    Presented with acute onset of left shoulder pain, radiating to jaw, then down chest.    PE was ruled out and no acute cardiac event has been determined. Echo and possible cath pending

## 2018-08-31 NOTE — ED PROVIDER NOTE - MEDICAL DECISION MAKING DETAILS
Hx of breast cancer with pleuritic chest pain; will check labs and get CXR and CTA chest to r/o PE.  If negative, will admit for inpatient echo and cardiac evaluation.

## 2018-08-31 NOTE — ED ADULT NURSE NOTE - CHPI ED NUR SYMPTOMS NEG
no chills/no dizziness/no fever/no vomiting/no nausea/no weakness/no tingling/no decreased eating/drinking

## 2018-08-31 NOTE — ED ADULT NURSE NOTE - OBJECTIVE STATEMENT
Pt presented to ED c/o shoulder pain. As per pt, pt had a sudden onset of left shoulder pain that radiates down to left chest wall, epigastric region, then also radiate up to left jaw now to both jaw. Pt also c/o severe pain when taking deep breath. Hx breat CA. Left breat mastectomy in January, or rad tx 2 wks into treatment.

## 2018-08-31 NOTE — CONSULT NOTE ADULT - ASSESSMENT
- has leftb chest pain  - CTA negative  - no evidence radiation pneumonitis  - no rub noted  - consider dose iv tylenol  - dvt proph
Atypical chest pain. She has reproducible chest wall tenderness and also has epigastric tenderness. Her CPK, troponin I are all normal. Echocardiogram demonstrated normal left ventricular ejection fraction no pericardial effusion is noted.  Breast cancer status post chemotherapy and she has been receiving radiation therapy. Her chest pain started after she received radiation therapy yesterday.  Suggest  Observe on telemetry.  Follow-up with CPK, troponin I and EKG.  Check ESR.  Add Protonix 40 mg by mouth once daily due to epigastric tenderness.  Start nonsteroidal anti-inflammatory drugs.  Discussed general condition with patient's  and hospitalist.
Would be unusual for this to be a radiation pneumonitis, or pleuritis, although not impossible. Typically this occurs weeks to months after RT. She is being treated to a large filed to include regional nodes, and she did receive adriamycin.    I would still favor a cardia event, and agree with Cardiology that further work up, echo and possible cath is indicated.    Will await further findings and reassess resuming RT next week

## 2018-08-31 NOTE — CHART NOTE - NSCHARTNOTEFT_GEN_A_CORE
Nurse Practitioner Progress note:     HPI:  Pt. is a 47 yo F with a hx of breast cancer s/p mastectomy, chemo, and 10 sessions of radiation therapy (last radiation 8/30 at 10am) BIB  for chest pain, upper back pain, left sided neck and shoulder pain and trouble breathing.  Pain is worse with laughing, talking, breathing.  Patient never had these symptoms after radiation before.  Patient also c/o cramp in left calf a few nights ago that felt like it travelled up leg and then disappeared.  Patient denies fever, cough, vomiting or diarrhea. Pain occurred gradually as patient was running errands yesterday. Pt. now referred for [facility] for further evaluation.    PSHx:  Mastectomy    Family hx:  Father: No CAD, No CVA  Mother: No CAV. No CVA     PmHx:  Breast cancer (31 Aug 2018 05:18)    T(C): 36.7 (08-31-18 @ 12:48), Max: 36.7 (08-31-18 @ 11:32)  HR: 113 (08-31-18 @ 14:40) (95 - 113)  BP: 114/69 (08-31-18 @ 14:40) (94/64 - 128/89)  RR: 16 (08-31-18 @ 14:40) (15 - 19)  SpO2: 97% (08-31-18 @ 14:40) (95% - 100%)  Wt(kg): --    PHYSICAL EXAM:  Neurologic: Non-focal, AxOx3.  No neuro deficits  Vascular: Peripheral pulses palpable 2+ bilaterally  Procedure Site: Rt. groin mynxx closure device site benign soft no bleeding no hematoma +1PP       LABS:	 	                        13.5   6.60  )-----------( 157      ( 31 Aug 2018 10:30 )             38.5   08-31    142  |  108  |  14  ----------------------------<  108<H>  4.1   |  28  |  0.76    Ca    9.3      31 Aug 2018 10:30    TPro  7.5  /  Alb  4.4  /  TBili  0.6  /  DBili  x   /  AST  21  /  ALT  30  /  AlkPhos  95  08-30        PROCEDURE RESULTS:  S/P [facility] non-obstructive CAD          ASSESSMENT/PLAN:   Pt. is a 47 yo F with a hx of breast cancer s/p mastectomy, chemo, and 10 sessions of radiation therapy (last radiation 8/30 at 10am) BIB  for chest pain, upper back pain, left sided neck and shoulder pain and trouble breathing.  S/P [facility]   	  -VS, labs, diet, activity as per post cath orders  -IV hydration  -Encourage PO fluids  -Continue current medications  -Plan of care D/W pt. and MD  -Post cath instructions reviewed with pt., pt. verbalizes and understands instructions  -Follow-up with attending

## 2018-08-31 NOTE — H&P ADULT - NEUROLOGICAL DETAILS
normal strength/responds to pain/responds to verbal commands/sensation intact/deep reflexes intact/cranial nerves intact/alert and oriented x 3

## 2018-08-31 NOTE — H&P ADULT - ASSESSMENT
49 yo female presented with left sided chest pain.    A/P:    1.  Pleuritic chest pain  -?radiation Pneumonitis  -follow Pulmonary and radiation onc consults  -follow clinically  -follow in telemetry, follow troponin    2.  Hypokalemia  -will replace potassium    3.  Lovenox for DVT ppx

## 2018-08-31 NOTE — ED ADULT NURSE NOTE - ED STAT RN HANDOFF DETAILS
A&OX4. No distress noted at this time. No c/o made at time. Quietly resting in stretcher.  at bedside. VSS. NSR noted on cardiac monitor. Right AC #20 IVL

## 2018-08-31 NOTE — ED PROVIDER NOTE - OBJECTIVE STATEMENT
Pt. is a 47 yo F with a hx of breast cancer s/p mastectomy, chemo, and 10 sessions of radiation therapy (last radiation 8/30 at 10am) BIB  for chest pain, upper back pain, left sided neck and shoulder pain and trouble breathing.  Pain is worse with laughing, talking, breathing.  Patient never had these symptoms after radiation before.  Patient also c/o cramp in left calf a few nights ago that felt like it travelled up leg and then disappeared.  Patient denies fever, cough, vomiting or diarrhea. Pain occurred gradually as patient was running errands yesterday.

## 2018-09-01 ENCOUNTER — TRANSCRIPTION ENCOUNTER (OUTPATIENT)
Age: 48
End: 2018-09-01

## 2018-09-01 VITALS
HEART RATE: 102 BPM | TEMPERATURE: 98 F | SYSTOLIC BLOOD PRESSURE: 107 MMHG | RESPIRATION RATE: 18 BRPM | OXYGEN SATURATION: 94 % | DIASTOLIC BLOOD PRESSURE: 63 MMHG

## 2018-09-01 LAB — TROPONIN I SERPL-MCNC: <0.015 NG/ML — SIGNIFICANT CHANGE UP (ref 0.01–0.04)

## 2018-09-01 PROCEDURE — 99232 SBSQ HOSP IP/OBS MODERATE 35: CPT

## 2018-09-01 PROCEDURE — 93010 ELECTROCARDIOGRAM REPORT: CPT

## 2018-09-01 RX ORDER — IBUPROFEN 200 MG
1 TABLET ORAL
Qty: 0 | Refills: 0 | DISCHARGE
Start: 2018-09-01

## 2018-09-01 RX ADMIN — PANTOPRAZOLE SODIUM 40 MILLIGRAM(S): 20 TABLET, DELAYED RELEASE ORAL at 05:35

## 2018-09-01 RX ADMIN — Medication 600 MILLIGRAM(S): at 05:35

## 2018-09-01 RX ADMIN — Medication 600 MILLIGRAM(S): at 00:15

## 2018-09-01 RX ADMIN — Medication 600 MILLIGRAM(S): at 11:52

## 2018-09-01 RX ADMIN — Medication 600 MILLIGRAM(S): at 11:31

## 2018-09-01 NOTE — DISCHARGE NOTE ADULT - PATIENT PORTAL LINK FT
You can access the [facility] Patient Portal, offered by [facility], by registering with the following website: [email or web address]

## 2018-09-01 NOTE — PROGRESS NOTE ADULT - ASSESSMENT
- has leftb chest pain  - CTA negative  - no evidence radiation pneumonitis  - no rub noted  - pulmonary status stable for discharge. Can follow as outpatient.  - dvt proph

## 2018-09-01 NOTE — DISCHARGE NOTE ADULT - MEDICATION SUMMARY - MEDICATIONS TO TAKE
I will START or STAY ON the medications listed below when I get home from the hospital:    ibuprofen 600 mg oral tablet  -- 1 tab(s) by mouth every 6 hours, As Needed  -- Indication: For .

## 2018-09-01 NOTE — DISCHARGE NOTE ADULT - CARE PLAN
Principal Discharge DX:	Pleuritic chest pain  Goal:	resolved  Assessment and plan of treatment:	f/u with your pcp/cardio/onco in 1 week  take ibuprofen as needed  cardiac cath normal  NO PE on CTA chest

## 2018-09-01 NOTE — PROGRESS NOTE ADULT - ASSESSMENT
No further chest pain. Patient's echocardiogram and cardiac catheterization is normal.  History of breast cancer status post chemotherapy and radiation therapy.  Suggest  Continue with nonsteroidal anti-inflammatory drugs when necessary.  Patient was reassured her chest pain was noncardiac.  I she'll now follow up when necessary  Discussed with patient's .

## 2018-09-01 NOTE — DISCHARGE NOTE ADULT - PLAN OF CARE
resolved f/u with your pcp/cardio/onco in 1 week  take ibuprofen as needed  cardiac cath normal  NO PE on CTA chest

## 2018-09-01 NOTE — PROGRESS NOTE ADULT - SUBJECTIVE AND OBJECTIVE BOX
HPI:  Patient is 48-year-old with history of breast cancer status post mastectomy in the past, she has received chemotherapy and has been receiving radiation therapy, She was admitted with complains of left-sided chest pain, shoulder pain and neck pain.  Due to a recurrent severe chest pain, She underwent cardiac catheterization and she was diagnosed to have normal coronaries. Pulses yesterday she has been comfortable and has been asymptomatic. No further chest pain or shortness of breath. She is not ambulating without any discomfort.     Transthoracic Echocardiogram (08.31.18    Summary     Endocardium is not well visualized, however, overall left ventricular   systolic function appears normal. Estimated left ventricular ejection   fraction is 60-65%.   Mild diastolic dysfunction (stage I).   Normal appearing right ventricle structure and function.        MEDICATIONS  (STANDING):  aspirin enteric coated 81 milliGRAM(s) Oral daily  enoxaparin Injectable 40 milliGRAM(s) SubCutaneous every 24 hours  ibuprofen  Tablet 600 milliGRAM(s) Oral every 6 hours  pantoprazole    Tablet 40 milliGRAM(s) Oral before breakfast  sodium chloride 0.9%. 1000 milliLiter(s) (10 mL/Hr) IV Continuous <Continuous>    MEDICATIONS  (PRN):  acetaminophen   Tablet 650 milliGRAM(s) Oral every 8 hours PRN For Temp greater than 38 C (100.4 F)  acetaminophen   Tablet. 650 milliGRAM(s) Oral every 8 hours PRN Mild Pain (1 - 3)  morphine  - Injectable 4 milliGRAM(s) IV Push every 6 hours PRN Severe Pain (7 - 10)        REVIEW OF SYSTEM:  Pertinent items are noted in HPI.  Constitutional	Negative for chills, fevers, sweats.    Eyes: 	Negative for visual disturbance.  Ears, nose, mouth, throat, and face: Negative for epistaxis, nasal congestion, sore throat and tinnitus.  Neck:	Negative for enlargement, pain and difficulty in swallowing  Respiration : Negative for cough, dyspnea on exertion, pleuritic chest pain and wheezing  Cardiovascular: Negative for chest pain, dyspnea and palpitations    Gastrointestinal : Negative for abdominal pain, diarrhea, nausea and vomiting  Genitourinary: Negative for dysuria, frequency and urinary incontinence .  Skin: Negative for  rash, pruritus, swelling, dryness .  	  Hematologic/lymphatic: Negative for bleeding and easy bruising  Musculoskeletal: Negative for arthralgias, back pain and muscle weakness.  Neurological: Negative for dizziness, headaches, seizures and tremors  Behavioral/Psych: Negative for mood change, depression.  Endocrine:	Negative for blurry vision, polydipsia and polyuria, diaphoresis.   Allergic/Immunologic:	Negative for anaphylaxis, angioedema and urticaria.      Vital Signs Last 24 Hrs  T(C): 36.8 (01 Sep 2018 10:06), Max: 36.9 (31 Aug 2018 20:46)  T(F): 98.3 (01 Sep 2018 10:06), Max: 98.4 (31 Aug 2018 20:46)  HR: 102 (01 Sep 2018 10:06) (91 - 120)  BP: 107/63 (01 Sep 2018 10:06) (94/56 - 114/78)  BP(mean): --  RR: 18 (01 Sep 2018 10:06) (15 - 18)  SpO2: 94% (01 Sep 2018 10:06) (94% - 100%)    I&O's Summary    PHYSICAL EXAM  General Appearance: cooperative, no acute distress,   HEENT: PERRL, conjunctiva clear, EOM's intact .  Neck: Supple, , no adenopathy, thyroid: not enlarged, no carotid bruit or JVD  Back: Symmetric, no  tenderness,no soft tissue tenderness  Lungs: Clear to auscultation bilateral,no adventitious breath sounds, normal   expiratory phase  Heart: Regular rate and rhythm, S1, S2 normal, no murmur, rub or gallop  Abdomen: Soft, non-tender, bowel sounds active , no hepatosplenomegaly  Extremities: no cyanosis or edema, no joint swelling , right groin WNL.  Skin: Skin color, texture normal, no rashes   Neurologic: Alert and oriented X3 , cranial nerves intact, sensory and motor normal,        INTERPRETATION OF TELEMETRY: NSR            LABS:                          13.5   6.60  )-----------( 157      ( 31 Aug 2018 10:30 )             38.5     08-31    142  |  108  |  14  ----------------------------<  108<H>  4.1   |  28  |  0.76    Ca    9.3      31 Aug 2018 10:30    TPro  7.5  /  Alb  4.4  /  TBili  0.6  /  DBili  x   /  AST  21  /  ALT  30  /  AlkPhos  95  08-30    CARDIAC MARKERS ( 01 Sep 2018 00:53 )  <0.015 ng/mL / x     / x     / x     / x      CARDIAC MARKERS ( 31 Aug 2018 17:06 )  <0.015 ng/mL / x     / x     / x     / x      CARDIAC MARKERS ( 31 Aug 2018 10:30 )  <0.015 ng/mL / x     / x     / x     / x      CARDIAC MARKERS ( 31 Aug 2018 07:52 )  <0.015 ng/mL / x     / 57 U/L / x     / x      CARDIAC MARKERS ( 31 Aug 2018 05:06 )  <0.015 ng/mL / x     / x     / x     / x      CARDIAC MARKERS ( 30 Aug 2018 23:45 )  <0.015 ng/mL / x     / 84 U/L / x     / x            Pro BNP  40 08-30 @ 23:45  D Dimer  -- 08-30 @ 23:45    PT/INR - ( 30 Aug 2018 23:45 )   PT: 11.0 sec;   INR: 1.02 ratio         PTT - ( 30 Aug 2018 23:45 )  PTT:32.0 sec          RADIOLOGY & ADDITIONAL STUDIES:

## 2018-09-01 NOTE — DISCHARGE NOTE ADULT - HOSPITAL COURSE
HPI:  Pt. is a 47 yo F with a hx of breast cancer s/p mastectomy, chemo, and 10 sessions of radiation therapy (last radiation 8/30 at 10am) BIB  for chest pain, upper back pain, left sided neck and shoulder pain and trouble breathing.  Pain is worse with laughing, talking, breathing.  Patient never had these symptoms after radiation before.  Patient also c/o cramp in left calf a few nights ago that felt like it travelled up leg and then disappeared.  Patient denies fever, cough, vomiting or diarrhea. Pain occurred gradually as patient was running errands yesterday.    PSHx:  Mastectomy      PHYSICAL EXAM:      Vital Signs Last 24 Hrs  T(C): 36.8 (01 Sep 2018 10:06), Max: 36.9 (31 Aug 2018 20:46)  T(F): 98.3 (01 Sep 2018 10:06), Max: 98.4 (31 Aug 2018 20:46)  HR: 102 (01 Sep 2018 10:06) (91 - 120)  BP: 107/63 (01 Sep 2018 10:06) (94/56 - 114/78)  RR: 18 (01 Sep 2018 10:06) (16 - 18)  SpO2: 94% (01 Sep 2018 10:06) (94% - 100%)      Constitutional: Well appearing  HEENT: Atraumatic, EMRE, Normal, No congestion  Respiratory: Breath Sounds normal, no rhonchi/wheeze  Cardiovascular: N S1S2;   Gastrointestinal: Abdomen soft, non tender, Bowel Sounds present  Extremities: No edema, peripheral pulses present  Neurological: AAO x 3, no gross focal motor deficits  Skin: Non cellulitic, no rash, ulcers      47 yo female presented with left sided chest pain.    A/P:    1.  Pleuritic chest pain: No ac MI  -No radiation Pneumonitis  - cardiac cath negative  echo normal  pt cleared for discharge by cardio  pain resolved  take ibuprofen prn for pain    2.  Hypokalemia  -replaced potassium; normalized    poc discussed with pt, team    d/c home today    total time spent 45 min

## 2018-09-05 ENCOUNTER — APPOINTMENT (OUTPATIENT)
Dept: CT IMAGING | Facility: CLINIC | Age: 48
End: 2018-09-05
Payer: COMMERCIAL

## 2018-09-05 ENCOUNTER — OUTPATIENT (OUTPATIENT)
Dept: OUTPATIENT SERVICES | Facility: HOSPITAL | Age: 48
LOS: 1 days | End: 2018-09-05
Payer: COMMERCIAL

## 2018-09-05 DIAGNOSIS — Z90.89 ACQUIRED ABSENCE OF OTHER ORGANS: Chronic | ICD-10-CM

## 2018-09-05 DIAGNOSIS — Z98.891 HISTORY OF UTERINE SCAR FROM PREVIOUS SURGERY: Chronic | ICD-10-CM

## 2018-09-05 DIAGNOSIS — Z00.8 ENCOUNTER FOR OTHER GENERAL EXAMINATION: ICD-10-CM

## 2018-09-05 PROCEDURE — 70491 CT SOFT TISSUE NECK W/DYE: CPT | Mod: 26

## 2018-09-05 PROCEDURE — 70491 CT SOFT TISSUE NECK W/DYE: CPT

## 2018-09-06 ENCOUNTER — OUTPATIENT (OUTPATIENT)
Dept: OUTPATIENT SERVICES | Facility: HOSPITAL | Age: 48
LOS: 1 days | End: 2018-09-06
Payer: COMMERCIAL

## 2018-09-06 ENCOUNTER — APPOINTMENT (OUTPATIENT)
Dept: MRI IMAGING | Facility: CLINIC | Age: 48
End: 2018-09-06
Payer: COMMERCIAL

## 2018-09-06 DIAGNOSIS — Z98.891 HISTORY OF UTERINE SCAR FROM PREVIOUS SURGERY: Chronic | ICD-10-CM

## 2018-09-06 DIAGNOSIS — Z90.89 ACQUIRED ABSENCE OF OTHER ORGANS: Chronic | ICD-10-CM

## 2018-09-06 DIAGNOSIS — Z00.8 ENCOUNTER FOR OTHER GENERAL EXAMINATION: ICD-10-CM

## 2018-09-06 PROCEDURE — A9585: CPT

## 2018-09-06 PROCEDURE — 72156 MRI NECK SPINE W/O & W/DYE: CPT | Mod: 26

## 2018-09-06 PROCEDURE — 72156 MRI NECK SPINE W/O & W/DYE: CPT

## 2018-09-11 DIAGNOSIS — R07.9 CHEST PAIN, UNSPECIFIED: ICD-10-CM

## 2018-09-11 DIAGNOSIS — E87.6 HYPOKALEMIA: ICD-10-CM

## 2018-09-11 DIAGNOSIS — I25.118 ATHEROSCLEROTIC HEART DISEASE OF NATIVE CORONARY ARTERY WITH OTHER FORMS OF ANGINA PECTORIS: ICD-10-CM

## 2018-09-11 DIAGNOSIS — Z92.21 PERSONAL HISTORY OF ANTINEOPLASTIC CHEMOTHERAPY: ICD-10-CM

## 2018-09-11 DIAGNOSIS — Z85.3 PERSONAL HISTORY OF MALIGNANT NEOPLASM OF BREAST: ICD-10-CM

## 2018-10-11 ENCOUNTER — APPOINTMENT (OUTPATIENT)
Dept: BREAST CENTER | Facility: CLINIC | Age: 48
End: 2018-10-11
Payer: COMMERCIAL

## 2018-10-11 VITALS
BODY MASS INDEX: 24.03 KG/M2 | HEIGHT: 65.5 IN | DIASTOLIC BLOOD PRESSURE: 82 MMHG | WEIGHT: 146 LBS | HEART RATE: 100 BPM | SYSTOLIC BLOOD PRESSURE: 122 MMHG

## 2018-10-11 DIAGNOSIS — C50.919 MALIGNANT NEOPLASM OF UNSPECIFIED SITE OF UNSPECIFIED FEMALE BREAST: ICD-10-CM

## 2018-10-11 PROCEDURE — 99213 OFFICE O/P EST LOW 20 MIN: CPT

## 2019-01-04 ENCOUNTER — APPOINTMENT (OUTPATIENT)
Dept: DERMATOLOGY | Facility: CLINIC | Age: 49
End: 2019-01-04
Payer: COMMERCIAL

## 2019-01-04 ENCOUNTER — APPOINTMENT (OUTPATIENT)
Dept: BREAST CENTER | Facility: CLINIC | Age: 49
End: 2019-01-04
Payer: COMMERCIAL

## 2019-01-04 VITALS — HEIGHT: 65 IN | WEIGHT: 148 LBS | BODY MASS INDEX: 24.66 KG/M2

## 2019-01-04 VITALS
DIASTOLIC BLOOD PRESSURE: 80 MMHG | HEIGHT: 65.5 IN | SYSTOLIC BLOOD PRESSURE: 118 MMHG | HEART RATE: 76 BPM | BODY MASS INDEX: 24.36 KG/M2 | WEIGHT: 148 LBS

## 2019-01-04 DIAGNOSIS — R21 RASH AND OTHER NONSPECIFIC SKIN ERUPTION: ICD-10-CM

## 2019-01-04 PROCEDURE — 99243 OFF/OP CNSLTJ NEW/EST LOW 30: CPT

## 2019-01-04 PROCEDURE — 99214 OFFICE O/P EST MOD 30 MIN: CPT

## 2019-01-04 RX ORDER — CAPECITABINE 500 MG/1
500 TABLET, FILM COATED ORAL
Qty: 84 | Refills: 0 | Status: DISCONTINUED | COMMUNITY
Start: 2018-02-13 | End: 2019-01-04

## 2019-01-04 RX ORDER — APREPITANT 80 MG/1
80 CAPSULE ORAL
Qty: 2 | Refills: 0 | Status: DISCONTINUED | COMMUNITY
Start: 2017-09-12 | End: 2019-01-04

## 2019-01-04 RX ORDER — OMEPRAZOLE 20 MG/1
20 CAPSULE, DELAYED RELEASE ORAL
Qty: 30 | Refills: 0 | Status: DISCONTINUED | COMMUNITY
Start: 2017-09-14 | End: 2019-01-04

## 2019-01-04 RX ORDER — LIDOCAINE AND PRILOCAINE 25; 25 MG/G; MG/G
2.5-2.5 CREAM TOPICAL
Qty: 30 | Refills: 0 | Status: DISCONTINUED | COMMUNITY
Start: 2017-09-14 | End: 2019-01-04

## 2019-01-04 RX ORDER — LORAZEPAM 1 MG/1
1 TABLET ORAL
Qty: 20 | Refills: 0 | Status: DISCONTINUED | COMMUNITY
Start: 2017-10-27 | End: 2019-01-04

## 2019-01-04 RX ORDER — NIZATIDINE 150 MG/1
150 CAPSULE ORAL
Qty: 30 | Refills: 0 | Status: DISCONTINUED | COMMUNITY
Start: 2017-09-14 | End: 2019-01-04

## 2019-01-04 NOTE — PHYSICAL EXAM
[FreeTextEntry3] : Skin examination performed of the face, neck, chest, hands, lower legs;\par The patient is well, alert and oriented, pleasant and cooperative.\par Eyelids, conjunctivae, oral mucosa, digits and nails all normal.  \par No cervical adenopathy.\par \par \par Erythematous scaling patch with inflammation Left mid lower breast;\par no induration, not affecting areola

## 2019-01-04 NOTE — ASSESSMENT
[FreeTextEntry1] : Rash:  appears eczematous;  most likely irritant dermatitis, 2' XRT and post surgical changes;\par mometasone ointment x 1-2 weeks, aquaphor maintenance;\par f/u if does not improve;

## 2019-01-04 NOTE — HISTORY OF PRESENT ILLNESS
[de-identified] : Rash;  near left breast x 3-4 weeks;  not itchy;  \par Had mastectomy with chemo and radiation in 2018;\par XRT from Sept. to November 2018\par no treatments attempted;  \par Referred by Dr. Prado

## 2019-02-08 ENCOUNTER — APPOINTMENT (OUTPATIENT)
Dept: DERMATOLOGY | Facility: CLINIC | Age: 49
End: 2019-02-08
Payer: COMMERCIAL

## 2019-02-08 DIAGNOSIS — R21 RASH AND OTHER NONSPECIFIC SKIN ERUPTION: ICD-10-CM

## 2019-02-08 DIAGNOSIS — L72.3 SEBACEOUS CYST: ICD-10-CM

## 2019-02-08 PROCEDURE — 99213 OFFICE O/P EST LOW 20 MIN: CPT | Mod: 25

## 2019-02-08 PROCEDURE — 10060 I&D ABSCESS SIMPLE/SINGLE: CPT

## 2019-02-08 NOTE — PHYSICAL EXAM
[FreeTextEntry3] : Left inferior breast;  markedly decreased erythema, but still present;\par focal group of milia;  L mid lower breast; \par

## 2019-02-08 NOTE — ASSESSMENT
[FreeTextEntry1] : Prior irritant dermatitis markedly improved;  use aquaphor only;\par \par I&D to small anmol of milia;  ? small scar at this site;\par \par recheck in spring;  use mometasone only prn for itching

## 2019-02-25 ENCOUNTER — APPOINTMENT (OUTPATIENT)
Dept: OBGYN | Facility: CLINIC | Age: 49
End: 2019-02-25
Payer: COMMERCIAL

## 2019-02-25 VITALS
SYSTOLIC BLOOD PRESSURE: 110 MMHG | HEIGHT: 65 IN | WEIGHT: 147.25 LBS | DIASTOLIC BLOOD PRESSURE: 76 MMHG | BODY MASS INDEX: 24.53 KG/M2

## 2019-02-25 DIAGNOSIS — Z80.0 FAMILY HISTORY OF MALIGNANT NEOPLASM OF DIGESTIVE ORGANS: ICD-10-CM

## 2019-02-25 DIAGNOSIS — Z78.9 OTHER SPECIFIED HEALTH STATUS: ICD-10-CM

## 2019-02-25 DIAGNOSIS — Z01.419 ENCOUNTER FOR GYNECOLOGICAL EXAMINATION (GENERAL) (ROUTINE) W/OUT ABNORMAL FINDINGS: ICD-10-CM

## 2019-02-25 PROCEDURE — 99386 PREV VISIT NEW AGE 40-64: CPT

## 2019-02-25 NOTE — PHYSICAL EXAM
[Awake] : awake [Alert] : alert [Acute Distress] : no acute distress [Mass] : no breast mass [Nipple Discharge] : no nipple discharge [Axillary LAD] : no axillary lymphadenopathy [Soft] : soft [Tender] : non tender [Oriented x3] : oriented to person, place, and time [Vulvar Atrophy] : vulvar atrophy [Normal] : uterus [Atrophy] : atrophy [No Bleeding] : there was no active vaginal bleeding [Pap Obtained] : a Pap smear was performed [Normal Position] : in a normal position [Tenderness] : nontender [Enlarged ___ wks] : not enlarged [Mass ___ cm] : no uterine mass was palpated [Uterine Adnexae] : were not tender and not enlarged [Adnexa Tenderness] : were not tender [Ovarian Mass (___ Cm)] : there were no adnexal masses [No Tenderness] : no rectal tenderness [Occult Blood] : occult blood test from digital rectal exam was negative

## 2019-02-25 NOTE — HISTORY OF PRESENT ILLNESS
[___ Year(s) Ago] : [unfilled] year(s) ago [Good] : being in good health [Healthy Diet] : a healthy diet [Regular Exercise] : regular exercise [Last Mammogram ___] : Last Mammogram was [unfilled] [Last Colonoscopy ___] : Last colonoscopy [unfilled] [Last Pap ___] : Last cervical pap smear was [unfilled] [Postmenopausal] : is postmenopausal [Sexually Active] : is sexually active [de-identified] : lubricants

## 2019-02-25 NOTE — CHIEF COMPLAINT
[Initial Visit] : initial GYN visit [FreeTextEntry1] : Dx with metastatic cancer of breast at 47. BRCA negative, unsure about other genes.\par Last gyn exam 10/2017, had d anc 2/17 for endometrial cells on pap, embx unsuccessful.\par LMP 8/2017 with chemo, no vb since. 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 Sees Dr. Prado q 6 months and onc as well.

## 2019-02-28 LAB
CYTOLOGY CVX/VAG DOC THIN PREP: NORMAL
HPV HIGH+LOW RISK DNA PNL CVX: NOT DETECTED

## 2019-03-06 ENCOUNTER — APPOINTMENT (OUTPATIENT)
Dept: ULTRASOUND IMAGING | Facility: CLINIC | Age: 49
End: 2019-03-06

## 2019-03-06 ENCOUNTER — OUTPATIENT (OUTPATIENT)
Dept: OUTPATIENT SERVICES | Facility: HOSPITAL | Age: 49
LOS: 1 days | End: 2019-03-06
Payer: COMMERCIAL

## 2019-03-06 DIAGNOSIS — Z00.8 ENCOUNTER FOR OTHER GENERAL EXAMINATION: ICD-10-CM

## 2019-03-06 DIAGNOSIS — N63.20 UNSPECIFIED LUMP IN THE LEFT BREAST, UNSPECIFIED QUADRANT: ICD-10-CM

## 2019-03-06 DIAGNOSIS — Z98.891 HISTORY OF UTERINE SCAR FROM PREVIOUS SURGERY: Chronic | ICD-10-CM

## 2019-03-06 DIAGNOSIS — Z90.89 ACQUIRED ABSENCE OF OTHER ORGANS: Chronic | ICD-10-CM

## 2019-03-06 DIAGNOSIS — Z03.89 ENCOUNTER FOR OBSERVATION FOR OTHER SUSPECTED DISEASES AND CONDITIONS RULED OUT: ICD-10-CM

## 2019-03-06 PROCEDURE — 76705 ECHO EXAM OF ABDOMEN: CPT

## 2019-03-06 PROCEDURE — 76705 ECHO EXAM OF ABDOMEN: CPT | Mod: 26

## 2019-03-06 PROCEDURE — 76641 ULTRASOUND BREAST COMPLETE: CPT

## 2019-03-06 PROCEDURE — 76641 ULTRASOUND BREAST COMPLETE: CPT | Mod: 26,LT

## 2019-03-07 ENCOUNTER — APPOINTMENT (OUTPATIENT)
Dept: BREAST CENTER | Facility: CLINIC | Age: 49
End: 2019-03-07
Payer: COMMERCIAL

## 2019-03-07 VITALS
BODY MASS INDEX: 24.49 KG/M2 | HEIGHT: 65 IN | HEART RATE: 102 BPM | SYSTOLIC BLOOD PRESSURE: 116 MMHG | DIASTOLIC BLOOD PRESSURE: 74 MMHG | WEIGHT: 147 LBS

## 2019-03-07 DIAGNOSIS — Z85.3 ENCOUNTER FOR FOLLOW-UP EXAMINATION AFTER COMPLETED TREATMENT FOR MALIGNANT NEOPLASM: ICD-10-CM

## 2019-03-07 DIAGNOSIS — N61.0 MASTITIS WITHOUT ABSCESS: ICD-10-CM

## 2019-03-07 DIAGNOSIS — Z08 ENCOUNTER FOR FOLLOW-UP EXAMINATION AFTER COMPLETED TREATMENT FOR MALIGNANT NEOPLASM: ICD-10-CM

## 2019-03-07 DIAGNOSIS — L53.9 ERYTHEMATOUS CONDITION, UNSPECIFIED: ICD-10-CM

## 2019-03-07 PROCEDURE — 99214 OFFICE O/P EST MOD 30 MIN: CPT

## 2019-03-07 RX ORDER — MOMETASONE FUROATE 1 MG/G
0.1 OINTMENT TOPICAL
Qty: 1 | Refills: 1 | Status: DISCONTINUED | COMMUNITY
Start: 2019-01-04 | End: 2019-03-07

## 2019-03-07 NOTE — DATA REVIEWED
[FreeTextEntry1] : Pathology: 1/24/18   Left Breast -  retronipple tissue - negative.  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 ( 1 macrometastasis, 3 micrometastases) .No extranodal extension.  Lymphovascular invasion present in soft tissue surrounding  left internal mammary node.\par Stage: [id number]., triple negative.\par \par Right mammogram/sonogram:  8/16/18   Findings:  No suspicious findings.\par \par Ultrasound left reconstructed breast:  3/6/19    Findings: Diffuse edema involving the left reconstructed breast. No fluid collections or masses.

## 2019-03-07 NOTE — CONSULT LETTER
[Dear  ___] : Dear ~CIARAN, [Courtesy Letter:] : I had the pleasure of seeing your patient, [unfilled], in my office today. [Please see my note below.] : Please see my note below. [Sincerely,] : Sincerely, [FreeTextEntry2] : Shravan Moulton MD [FreeTextEntry3] : Kaci Prado MD FACS\par  [DrWilian  ___] : Dr. MACIAS [DrWilian ___] : Dr. MACIAS

## 2019-03-07 NOTE — PHYSICAL EXAM
[Sclera nonicteric] : sclera nonicteric [Conjunctiva pink] : conjunctiva pink [No Supraclavicular Adenopathy] : no supraclavicular adenopathy [No Cervical Adenopathy] : no cervical adenopathy [No Thyromegaly] : no thyromegaly [Clear to Auscultation Bilat] : clear to auscultation bilaterally [Normal Sinus Rhythm] : normal sinus rhythm [No Gallops] : no gallops [No Rubs] : no pericardial rub [Asymmetrical] : asymmetrical [Grade 2] : Ptosis Grade 2 [No dominant masses] : no dominant masses in right breast  [No dominant masses] : no dominant masses left breast [No Nipple Retraction] : no left nipple retraction [No Nipple Discharge] : no left nipple discharge [No Axillary Lymphadenopathy] : no right axillary lymphadenopathy [Soft] : abdomen soft [No Hepato-Splenomegaly] : no hepato-splenomegaly [No Swelling] : no swelling [No Edema] : no edema [de-identified] : S/P left  nipple sparing modified radical mastectomy with KAREEM flap. Elliptical skin paddle 1:00 ( KAREEM skin).\par Diffuse, blotchy erythema involving the whole reconstructed breast. Skin is thickened and boggy. Tiny nodules palpated both superiorly and laterally.   [de-identified] : pelvic incision healing well.

## 2019-03-07 NOTE — HISTORY OF PRESENT ILLNESS
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 She underwent a left nipple sparing modified radical mastectomy with KAREEM flap reconstruction on 1/2/18.  She completed radiation therapy on 9/21/18.  The patient  developed a localized rash on her left mastectomy flap in January 2019  and was evaluated by dermatology, Dr. Hermosillo who diagnosed it as eczema.   It did not completely resolve with the ointment prescribed.  On 2/8/19, the dermatologist incised the area which was thought to be a focal group of milia in the left inferior breast.   \par The patient presents today with diffuse blotchy erythema of the entire left reconstructed breast.\par She denies pain, fevers or chills.  An ultrasound was ordered by Dr. Moulton which was done yesterday.   It showed diffuse edema of the left reconstructed breast.  No discrete mass was identified.\par

## 2019-03-07 NOTE — PAST MEDICAL HISTORY
[Menarche Age ____] : age at menarche was [unfilled] [Definite ___ (Date)] : the last menstrual period was [unfilled] [Total Preg ___] : G[unfilled] [Live Births ___] : P[unfilled]  [Age At Live Birth ___] : Age at live birth: [unfilled]

## 2019-03-08 ENCOUNTER — TRANSCRIPTION ENCOUNTER (OUTPATIENT)
Age: 49
End: 2019-03-08

## 2019-03-12 ENCOUNTER — RESULT REVIEW (OUTPATIENT)
Age: 49
End: 2019-03-12

## 2019-03-14 ENCOUNTER — FORM ENCOUNTER (OUTPATIENT)
Age: 49
End: 2019-03-14

## 2019-03-15 ENCOUNTER — APPOINTMENT (OUTPATIENT)
Dept: BREAST CENTER | Facility: CLINIC | Age: 49
End: 2019-03-15
Payer: COMMERCIAL

## 2019-03-15 ENCOUNTER — APPOINTMENT (OUTPATIENT)
Dept: MRI IMAGING | Facility: CLINIC | Age: 49
End: 2019-03-15
Payer: COMMERCIAL

## 2019-03-15 ENCOUNTER — OUTPATIENT (OUTPATIENT)
Dept: OUTPATIENT SERVICES | Facility: HOSPITAL | Age: 49
LOS: 1 days | End: 2019-03-15
Payer: COMMERCIAL

## 2019-03-15 DIAGNOSIS — Z00.8 ENCOUNTER FOR OTHER GENERAL EXAMINATION: ICD-10-CM

## 2019-03-15 DIAGNOSIS — Z90.89 ACQUIRED ABSENCE OF OTHER ORGANS: Chronic | ICD-10-CM

## 2019-03-15 DIAGNOSIS — C50.919 MALIGNANT NEOPLASM OF UNSPECIFIED SITE OF UNSPECIFIED FEMALE BREAST: ICD-10-CM

## 2019-03-15 DIAGNOSIS — Z98.891 HISTORY OF UTERINE SCAR FROM PREVIOUS SURGERY: Chronic | ICD-10-CM

## 2019-03-15 PROCEDURE — C8937: CPT

## 2019-03-15 PROCEDURE — A9585: CPT

## 2019-03-15 PROCEDURE — 77049 MRI BREAST C-+ W/CAD BI: CPT | Mod: 26

## 2019-03-15 PROCEDURE — 99214 OFFICE O/P EST MOD 30 MIN: CPT

## 2019-03-15 PROCEDURE — C8908: CPT

## 2019-03-17 PROBLEM — C50.919 RECURRENT BREAST CANCER: Status: ACTIVE | Noted: 2019-03-17

## 2019-03-17 NOTE — PHYSICAL EXAM
[Sclera nonicteric] : sclera nonicteric [No Supraclavicular Adenopathy] : no supraclavicular adenopathy [Conjunctiva pink] : conjunctiva pink [No Cervical Adenopathy] : no cervical adenopathy [No Thyromegaly] : no thyromegaly [Clear to Auscultation Bilat] : clear to auscultation bilaterally [No Gallops] : no gallops [Normal Sinus Rhythm] : normal sinus rhythm [No Rubs] : no pericardial rub [Asymmetrical] : asymmetrical [Grade 2] : Ptosis Grade 2 [No dominant masses] : no dominant masses in right breast  [No dominant masses] : no dominant masses left breast [No Nipple Retraction] : no left nipple retraction [No Nipple Discharge] : no left nipple discharge [No Axillary Lymphadenopathy] : no right axillary lymphadenopathy [No Hepato-Splenomegaly] : no hepato-splenomegaly [Soft] : abdomen soft [No Swelling] : no swelling [No Edema] : no edema [de-identified] : pelvic incision healing well.  [de-identified] : S/P left  nipple sparing modified radical mastectomy with KAREEM flap. Elliptical skin paddle 1:00 ( KAREEM skin).\par Diffuse, blotchy erythema involving the whole reconstructed breast. Skin is thickened and boggy. Tiny nodules palpated both superiorly and laterally.

## 2019-03-17 NOTE — DATA REVIEWED
[FreeTextEntry1] : Pathology: 1/24/18   Left Breast -  retronipple tissue - negative.  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 ( 1 macrometastasis, 3 micrometastases) .No extranodal extension.  Lymphovascular invasion present in soft tissue surrounding  left internal mammary node.\par Stage: [id number]., triple negative.\par \par Right mammogram/sonogram:  8/16/18   Findings:  No suspicious findings.\par \par Ultrasound left reconstructed breast:  3/6/19    Findings: Diffuse edema involving the left reconstructed breast. No fluid collections or masses. \par \par Pathology ( incisional skin biopsies left mastectomy flap x 2) - invasive ductal carcinoma. Receptors pending.

## 2019-05-22 ENCOUNTER — APPOINTMENT (OUTPATIENT)
Dept: DERMATOLOGY | Facility: CLINIC | Age: 49
End: 2019-05-22

## 2019-08-18 NOTE — H&P ADULT - NSHPPHYSICALEXAM_GEN_ALL_CORE
loss of vision R
Vital Signs Last 24 Hrs  T(C): 36.2 (31 Aug 2018 04:04), Max: 36.4 (30 Aug 2018 23:25)  T(F): 97.2 (31 Aug 2018 04:04), Max: 97.6 (30 Aug 2018 23:25)  HR: 98 (31 Aug 2018 04:04) (98 - 101)  BP: 101/60 (31 Aug 2018 04:04) (101/60 - 128/89)  RR: 18 (31 Aug 2018 04:04) (18 - 18)  SpO2: 100% (31 Aug 2018 04:04) (100% - 100%)

## 2020-10-11 ENCOUNTER — EMERGENCY (EMERGENCY)
Facility: HOSPITAL | Age: 50
LOS: 1 days | Discharge: HOME | End: 2020-10-11
Attending: EMERGENCY MEDICINE | Admitting: EMERGENCY MEDICINE
Payer: COMMERCIAL

## 2020-10-11 VITALS
HEART RATE: 97 BPM | RESPIRATION RATE: 18 BRPM | OXYGEN SATURATION: 97 % | TEMPERATURE: 98 F | HEIGHT: 65 IN | SYSTOLIC BLOOD PRESSURE: 125 MMHG | DIASTOLIC BLOOD PRESSURE: 82 MMHG | WEIGHT: 128.97 LBS

## 2020-10-11 VITALS
RESPIRATION RATE: 16 BRPM | HEART RATE: 89 BPM | SYSTOLIC BLOOD PRESSURE: 116 MMHG | TEMPERATURE: 98 F | DIASTOLIC BLOOD PRESSURE: 71 MMHG | OXYGEN SATURATION: 98 %

## 2020-10-11 DIAGNOSIS — Z90.89 ACQUIRED ABSENCE OF OTHER ORGANS: Chronic | ICD-10-CM

## 2020-10-11 DIAGNOSIS — Z98.891 HISTORY OF UTERINE SCAR FROM PREVIOUS SURGERY: Chronic | ICD-10-CM

## 2020-10-11 PROCEDURE — 99283 EMERGENCY DEPT VISIT LOW MDM: CPT

## 2020-10-11 PROCEDURE — 74019 RADEX ABDOMEN 2 VIEWS: CPT

## 2020-10-11 PROCEDURE — 74019 RADEX ABDOMEN 2 VIEWS: CPT | Mod: 26

## 2020-10-11 PROCEDURE — 99283 EMERGENCY DEPT VISIT LOW MDM: CPT | Mod: 25

## 2020-10-11 RX ORDER — GABAPENTIN 400 MG/1
0 CAPSULE ORAL
Qty: 0 | Refills: 0 | DISCHARGE

## 2020-10-11 RX ORDER — MINERAL OIL
133 OIL (ML) MISCELLANEOUS ONCE
Refills: 0 | Status: COMPLETED | OUTPATIENT
Start: 2020-10-11 | End: 2020-10-11

## 2020-10-11 RX ORDER — LIDOCAINE HCL 20 MG/ML
6 VIAL (ML) INJECTION ONCE
Refills: 0 | Status: COMPLETED | OUTPATIENT
Start: 2020-10-11 | End: 2020-10-11

## 2020-10-11 RX ORDER — LIDOCAINE 4 G/100G
1 CREAM TOPICAL ONCE
Refills: 0 | Status: DISCONTINUED | OUTPATIENT
Start: 2020-10-11 | End: 2020-10-11

## 2020-10-11 RX ORDER — HYDROCORTISONE 1 %
1 OINTMENT (GRAM) TOPICAL
Qty: 10 | Refills: 0
Start: 2020-10-11 | End: 2020-10-15

## 2020-10-11 RX ORDER — MULTIVIT WITH MIN/MFOLATE/K2 340-15/3 G
1 POWDER (GRAM) ORAL ONCE
Refills: 0 | Status: COMPLETED | OUTPATIENT
Start: 2020-10-11 | End: 2020-10-11

## 2020-10-11 RX ADMIN — Medication 1 BOTTLE: at 14:03

## 2020-10-11 RX ADMIN — Medication 133 MILLILITER(S): at 12:39

## 2020-10-11 RX ADMIN — Medication 6 MILLILITER(S): at 12:04

## 2020-10-11 NOTE — ED PROVIDER NOTE - NSFOLLOWUPINSTRUCTIONS_ED_ALL_ED_FT
Follow up with your PMD/GI in 1-2 days for re-evaluation, ongoing care and treatment. High fiber diet. Use anusol as prescribed. Sit in warm water or sitz baths for relief. Take magnesium citrate as directed. If having worsening of symptoms, fever, vomiting, abdominal pain or other related symptoms, RETURN TO THE ER IMMEDIATELY.       Constipation    WHAT YOU NEED TO KNOW:    What is constipation? Constipation is when you have hard, dry bowel movements, or you go longer than usual between bowel movements.     What causes constipation?   •Not enough water or high-fiber foods      •Lack of physical activity      •Certain medicines, such as opioid pain medicine, antidepressants, or high blood pressure medicine      •A medical condition such as hemorrhoids, diabetes, or a stroke      What are the signs and symptoms of constipation?   •Difficulty pushing out your bowel movement      •Pain or bleeding during your bowel movement      •A feeling that you did not finish having your bowel movement      •Nausea      •Bloating      •Headache      How is constipation treated? Medicine can help you have a bowel movement more easily. Medicines may increase moisture in your bowel movement or increase the motion of your intestines.   •A suppository may be used to help soften your bowel movements. This may make them easier to pass. A suppository is guided into your rectum through your anus.  Suppository for Constipation           •Laxatives can help stimulate your bowels to have a bowel movement. Your provider may recommend you only use laxatives for a short time. Long-term use may make your bowels dependent on the medicine.      •An enema is liquid medicine used to clear bowel movement from your rectum. The medicine is put into your rectum through your anus.  Enemas           What can I do to prevent constipation?   •Drink liquids as directed. You may need to drink extra liquids to help soften and move your bowels. Ask how much liquid to drink each day and which liquids are best for you.      •Eat high-fiber foods. This may help decrease constipation by adding bulk to your bowel movements. High-fiber foods include fruit, vegetables, whole-grain breads and cereals, and beans. Your healthcare provider or dietitian can help you create a high-fiber meal plan. Your provider may also recommend a fiber supplement if you cannot get enough fiber from food.              •Exercise regularly. Regular physical activity can help stimulate your intestines. Walking is a good exercise to prevent or relieve constipation. Ask which exercises are best for you.  Walking for Exercise           •Schedule a time each day to have a bowel movement. This may help train your body to have regular bowel movements. Bend forward while you are on the toilet to help move the bowel movement out. Sit on the toilet for at least 10 minutes, even if you do not have a bowel movement.      •Talk to your healthcare provider about your medicines. Certain medicines, such as opioids, can cause constipation. Your provider may be able to make medicine changes. For example, he or she may change the kind of medicine, or change when you take it.      When should I call my doctor?   •You have blood in your bowel movements.      •You have a fever and abdominal pain with the constipation.      •Your constipation gets worse.       •You start to vomit.      •You have questions or concerns about your condition or care.      CARE AGREEMENT:    You have the right to help plan your care. Learn about your health condition and how it may be treated. Discuss treatment options with your healthcare providers to decide what care you want to receive. You always have the right to refuse treatment.

## 2020-10-11 NOTE — ED PROVIDER NOTE - PATIENT PORTAL LINK FT
You can access the FollowMyHealth Patient Portal offered by [facility] by registering at the following website: [email or web address]. By joining [employer]’s FollowMyHealth portal, you will also be able to view your health information using other applications (apps) compatible with our system.

## 2020-10-11 NOTE — ED ADULT NURSE NOTE - OBJECTIVE STATEMENT
50 year old female presents to the ED complaining of constipation. Patient on chemo for breast cancer. Patient reports constipation and rectal pain for 3 days. Patient reports last BM 10/8. Patient taking senna and colace without relief. Patient also reports taking an enema without relief. Patient reports pain from internal hemorrhoids as well which is adding to the problem. Patient admits to mild abdominal pressure secondary to the constipation but denies abdominal pain, nausea/vomiting. Patient denies blood per rectum. Patient denies urinary symptoms or GYN symptoms. Patient denies fever/chills. Patient with bilateral upper extremity lymphedema, wrapped with compressions stockings.   pcp: Dr. Pacheco Bautista

## 2020-10-11 NOTE — ED PROVIDER NOTE - PROGRESS NOTE DETAILS
Manual disimpaction performed at bedside; small-moderate amount of stool removed, pt states that she feels better, will d/c home with mag citrate (wants to take it at home) and anusol, f/u GI. Manual disimpaction performed at bedside; small-moderate amount of stool removed, pt states that she feels better, will d/c home with mag citrate (wants to take it at home) and anusol supp, sitz baths, f/u GI. AXR reviewed with ED attending : stool noted throughout, no free air or obstruction noted. Manual disimpaction performed at bedside; small-moderate amount of stool removed, pt states that she feels better, will d/c home with mag citrate (wants to take it at home) and anusol supp, sitz baths, f/u GI.

## 2020-10-11 NOTE — ED PROVIDER NOTE - ATTENDING CONTRIBUTION TO CARE
Pt is a 51 yo female who presents to the ED with a cc of constipation and concern for impaction.  PMHx of left breast cancer s/p mastectomy with reconstruction (deep flap), h/o internal hemorrhoids. Pt reports that she has suffered from internal hemorrhoids since giving birth to her children and they sometimes flare up causing her issues.  She does endorse that she recently began a new chemotherapy 3 weeks ago but denies noting any acute issues with this.  Pt reports that for the last 3 days she has been unable to have a bowel movement and on Friday night she began to note increased rectal pain/pressure. She took Senokot and Colace yesterday with no relief. Pt also reports that she attempted an enema with no relief. She spoke with her oncologist and was advised to come to the ED.  Pt believes that the rectal pain,/ pressure is being worsened by her internal hemorrhoids.  Denies fever, chills, N/V, CP, SOB.  She does report intermittent lower abd cramping/bloating but states that this is usually present just before she needs to urinate and then resolves after urinating.  She has noted urinary frequency. On exam pt lying in bed NAD, NCAT, PERRL, EOMI, heart RR, lungs CTA, abd soft with hypoactive bowel sounds no TTP.  Rectal exam deferred to PA. Pt initially too tender to allow for bedside disimpaction. Will medicate with lidocaine, fleet mineral oil enema, and obtain abd x-ray.  Will monitor Pt is a 49 yo female who presents to the ED with a cc of constipation and concern for impaction.  PMHx of left breast cancer s/p mastectomy with reconstruction (deep flap), h/o internal hemorrhoids. Pt reports that she has suffered from internal hemorrhoids since giving birth to her children and they sometimes flare up causing her issues.  She does endorse that she recently began a new chemotherapy 3 weeks ago but denies noting any acute issues with this.  Pt reports that for the last 3 days she has been unable to have a bowel movement and on Friday night she began to note increased rectal pain/pressure. She took Senokot and Colace yesterday with no relief. Pt also reports that she attempted an enema with no relief. She spoke with her oncologist and was advised to come to the ED.  Pt believes that the rectal pain,/ pressure is being worsened by her internal hemorrhoids.  Denies fever, chills, N/V, CP, SOB.  She does report intermittent lower abd cramping/bloating but states that this is usually present just before she needs to urinate and then resolves after urinating.  She has noted urinary frequency. Pt reports that she undergoes frequent imaging of her abd/pelvis q 3 months and last had imaging 2 months ago.  Last colonoscopy was 2017. On exam pt lying in bed NAD, NCAT, PERRL, EOMI, heart RR, lungs CTA, abd soft with hypoactive bowel sounds no TTP.  Rectal exam deferred to PA. Pt initially too tender to allow for bedside disimpaction. Will medicate with lidocaine, fleet mineral oil enema, and obtain abd x-ray.  Will monitor

## 2020-10-11 NOTE — ED PROVIDER NOTE - NS ED MD DISPO DISCHARGE
“I have personally evaluated and examined the patient. The Attending was available to me as a supervising provider if needed.”
Home

## 2020-10-11 NOTE — ED PROVIDER NOTE - CLINICAL SUMMARY MEDICAL DECISION MAKING FREE TEXT BOX
49 yo F with hx of breast ca sp L mastectomy and currently on chemo co constipation x 3 days, last BM was on 10/8, started having rectal pain and pressure on Friday night, tried sennakot, colace and enema without relief, abd soft and NT, +stool noted in rectum, will attempt manual disimpaction, enema, AXR, reassess

## 2020-10-11 NOTE — ED PROVIDER NOTE - CARE PLAN
Principal Discharge DX:	Constipation   Principal Discharge DX:	Constipation  Secondary Diagnosis:	Fecal impaction in rectum

## 2020-10-11 NOTE — ED ADULT NURSE REASSESSMENT NOTE - NS ED NURSE REASSESS COMMENT FT1
Patient feeling better after enema and disimpaction. Plan for discharge home with magnesium citrate and follow up outpatient. Patient agrees with plan.

## 2020-10-11 NOTE — ED ADULT NURSE NOTE - CHPI ED NUR SYMPTOMS NEG
no abdominal distension/no burning urination/no diarrhea/no vomiting/no blood in stool/no dysuria/no fever/no hematuria/no chills/no nausea

## 2020-10-11 NOTE — ED PROVIDER NOTE - NONTENDER LOCATION
right upper quadrant/periumbilical/left costovertebral angle/+midline horizontal well healed surgical scar noted/left upper quadrant/right lower quadrant/umbilical/suprapubic/right costovertebral angle/left lower quadrant

## 2020-10-12 ENCOUNTER — EMERGENCY (EMERGENCY)
Facility: HOSPITAL | Age: 50
LOS: 1 days | Discharge: HOME | End: 2020-10-12
Attending: EMERGENCY MEDICINE
Payer: COMMERCIAL

## 2020-10-12 VITALS
DIASTOLIC BLOOD PRESSURE: 90 MMHG | RESPIRATION RATE: 20 BRPM | HEIGHT: 65 IN | OXYGEN SATURATION: 100 % | SYSTOLIC BLOOD PRESSURE: 152 MMHG | HEART RATE: 120 BPM | WEIGHT: 128.97 LBS | TEMPERATURE: 99 F

## 2020-10-12 VITALS
OXYGEN SATURATION: 95 % | DIASTOLIC BLOOD PRESSURE: 69 MMHG | RESPIRATION RATE: 18 BRPM | TEMPERATURE: 99 F | HEART RATE: 102 BPM | SYSTOLIC BLOOD PRESSURE: 130 MMHG

## 2020-10-12 DIAGNOSIS — Z90.89 ACQUIRED ABSENCE OF OTHER ORGANS: Chronic | ICD-10-CM

## 2020-10-12 DIAGNOSIS — Z98.891 HISTORY OF UTERINE SCAR FROM PREVIOUS SURGERY: Chronic | ICD-10-CM

## 2020-10-12 LAB
ALBUMIN SERPL ELPH-MCNC: 3.8 G/DL — SIGNIFICANT CHANGE UP (ref 3.3–5)
ALP SERPL-CCNC: 105 U/L — SIGNIFICANT CHANGE UP (ref 40–120)
ALT FLD-CCNC: 34 U/L — SIGNIFICANT CHANGE UP (ref 10–45)
ANION GAP SERPL CALC-SCNC: 13 MMOL/L — SIGNIFICANT CHANGE UP (ref 5–17)
APTT BLD: 31.6 SEC — SIGNIFICANT CHANGE UP (ref 27.5–35.5)
AST SERPL-CCNC: 35 U/L — SIGNIFICANT CHANGE UP (ref 10–40)
BASOPHILS # BLD AUTO: 0.04 K/UL — SIGNIFICANT CHANGE UP (ref 0–0.2)
BASOPHILS NFR BLD AUTO: 0.8 % — SIGNIFICANT CHANGE UP (ref 0–2)
BILIRUB SERPL-MCNC: 0.3 MG/DL — SIGNIFICANT CHANGE UP (ref 0.2–1.2)
BUN SERPL-MCNC: 8 MG/DL — SIGNIFICANT CHANGE UP (ref 7–23)
CALCIUM SERPL-MCNC: 9.3 MG/DL — SIGNIFICANT CHANGE UP (ref 8.4–10.5)
CHLORIDE SERPL-SCNC: 103 MMOL/L — SIGNIFICANT CHANGE UP (ref 96–108)
CO2 SERPL-SCNC: 24 MMOL/L — SIGNIFICANT CHANGE UP (ref 22–31)
CREAT SERPL-MCNC: 0.6 MG/DL — SIGNIFICANT CHANGE UP (ref 0.5–1.3)
EOSINOPHIL # BLD AUTO: 0.14 K/UL — SIGNIFICANT CHANGE UP (ref 0–0.5)
EOSINOPHIL NFR BLD AUTO: 2.8 % — SIGNIFICANT CHANGE UP (ref 0–6)
GLUCOSE SERPL-MCNC: 93 MG/DL — SIGNIFICANT CHANGE UP (ref 70–99)
HCT VFR BLD CALC: 37.4 % — SIGNIFICANT CHANGE UP (ref 34.5–45)
HGB BLD-MCNC: 12.4 G/DL — SIGNIFICANT CHANGE UP (ref 11.5–15.5)
IMM GRANULOCYTES NFR BLD AUTO: 0.2 % — SIGNIFICANT CHANGE UP (ref 0–1.5)
INR BLD: 1.12 RATIO — SIGNIFICANT CHANGE UP (ref 0.88–1.16)
LYMPHOCYTES # BLD AUTO: 1.02 K/UL — SIGNIFICANT CHANGE UP (ref 1–3.3)
LYMPHOCYTES # BLD AUTO: 20.2 % — SIGNIFICANT CHANGE UP (ref 13–44)
MCHC RBC-ENTMCNC: 31.9 PG — SIGNIFICANT CHANGE UP (ref 27–34)
MCHC RBC-ENTMCNC: 33.2 GM/DL — SIGNIFICANT CHANGE UP (ref 32–36)
MCV RBC AUTO: 96.1 FL — SIGNIFICANT CHANGE UP (ref 80–100)
MONOCYTES # BLD AUTO: 0.13 K/UL — SIGNIFICANT CHANGE UP (ref 0–0.9)
MONOCYTES NFR BLD AUTO: 2.6 % — SIGNIFICANT CHANGE UP (ref 2–14)
NEUTROPHILS # BLD AUTO: 3.7 K/UL — SIGNIFICANT CHANGE UP (ref 1.8–7.4)
NEUTROPHILS NFR BLD AUTO: 73.4 % — SIGNIFICANT CHANGE UP (ref 43–77)
NRBC # BLD: 0 /100 WBCS — SIGNIFICANT CHANGE UP (ref 0–0)
PLATELET # BLD AUTO: 307 K/UL — SIGNIFICANT CHANGE UP (ref 150–400)
POTASSIUM SERPL-MCNC: 3.6 MMOL/L — SIGNIFICANT CHANGE UP (ref 3.5–5.3)
POTASSIUM SERPL-SCNC: 3.6 MMOL/L — SIGNIFICANT CHANGE UP (ref 3.5–5.3)
PROT SERPL-MCNC: 6.4 G/DL — SIGNIFICANT CHANGE UP (ref 6–8.3)
PROTHROM AB SERPL-ACNC: 13.4 SEC — SIGNIFICANT CHANGE UP (ref 10.6–13.6)
RBC # BLD: 3.89 M/UL — SIGNIFICANT CHANGE UP (ref 3.8–5.2)
RBC # FLD: 14.2 % — SIGNIFICANT CHANGE UP (ref 10.3–14.5)
SODIUM SERPL-SCNC: 140 MMOL/L — SIGNIFICANT CHANGE UP (ref 135–145)
WBC # BLD: 5.04 K/UL — SIGNIFICANT CHANGE UP (ref 3.8–10.5)
WBC # FLD AUTO: 5.04 K/UL — SIGNIFICANT CHANGE UP (ref 3.8–10.5)

## 2020-10-12 PROCEDURE — 85610 PROTHROMBIN TIME: CPT

## 2020-10-12 PROCEDURE — 99284 EMERGENCY DEPT VISIT MOD MDM: CPT | Mod: 25

## 2020-10-12 PROCEDURE — 99285 EMERGENCY DEPT VISIT HI MDM: CPT

## 2020-10-12 PROCEDURE — 74177 CT ABD & PELVIS W/CONTRAST: CPT

## 2020-10-12 PROCEDURE — 85730 THROMBOPLASTIN TIME PARTIAL: CPT

## 2020-10-12 PROCEDURE — 74177 CT ABD & PELVIS W/CONTRAST: CPT | Mod: 26

## 2020-10-12 PROCEDURE — 85025 COMPLETE CBC W/AUTO DIFF WBC: CPT

## 2020-10-12 PROCEDURE — 93005 ELECTROCARDIOGRAM TRACING: CPT

## 2020-10-12 PROCEDURE — 80053 COMPREHEN METABOLIC PANEL: CPT

## 2020-10-12 PROCEDURE — 87635 SARS-COV-2 COVID-19 AMP PRB: CPT

## 2020-10-12 NOTE — ED PROVIDER NOTE - OBJECTIVE STATEMENT
Attn- pt seen in OR4 - pt with severe constipation and internal hemorrhoids sent to ER for admission for disimpaction under anesthesia.  pt became constipated over this past weekend.  Went to [place] ER yesterday - AXR performed and disimpaction attempted with unsatisfactory result.  Internal hemorrhoids exacerbated and c/o rectal pressure and sharp pain.  NO: fever, n/v, abdo pain, numbness, back pain, recent narcotic use.  Pt dx with breast CA 2017 and getting chemotherapy now (3 weeks on and one week off - currently off.  this is first cycle).  Pt scheduled to see Dr. Christian Umana Wed and advised to go to ER for Dr. Phelan today. PSHx - C- sect x 2 and flap reconstruction of left breast.  Lymphedema both arms Left >>Right Attn- pt seen in OR4 - pt with severe constipation and internal hemorrhoids sent to ER for admission for disimpaction under anesthesia.  pt became constipated over this past weekend.  Went to [place] ER yesterday - AXR performed and disimpaction attempted with unsatisfactory result.  Internal hemorrhoids exacerbated and c/o rectal pressure and sharp pain.  NO: fever, n/v, abdo pain, numbness, back pain, recent narcotic use.  Pt dx with breast CA 2017 and getting chemotherapy now (3 weeks on and one week off - currently off.  this is first cycle).  Pt scheduled to see Dr. Christian Pearce Wed and advised to go to ER for Dr. Phelan today. PSHx - C- sect x 2 and flap reconstruction of left breast.  Lymphedema both arms Left >>Right

## 2020-10-12 NOTE — ED PROVIDER NOTE - PATIENT PORTAL LINK FT
You can access the FollowMyHealth Patient Portal offered by [facility] by registering at the following website: [email or web address]. By joining [employer]’s FollowMyHealth portal, you will also be able to view your health information using other applications (apps) compatible with our system.

## 2020-10-12 NOTE — CONSULT NOTE ADULT - SUBJECTIVE AND OBJECTIVE BOX
GENERAL SURGERY CONSULT NOTE  --------------------------------------------------------------------------------------------    HPI:   Patient is a 50y old Female with history of left breast cancer s/p mastectomy and KAREEM flap currently on Chemo who presents with a chief complaint of rectal pain. Patient reports she visited the ED in [facility] for constipation and disimpaction but was unsuccessful 2/2 to pain and reportedly found to have internal hemorrhoids. Patient currently reports mild abdominal pain passing flatus.     PAST MEDICAL & SURGICAL HISTORY:  Breast cancer  left  breast, completed chemotherapy 2018    History of  section  ,     History of tonsillectomy      FAMILY HISTORY:  No pertinent family history in first degree relatives    SOCIAL HISTORY:    Denies smoking, reports rare EtOH use    ALLERGIES: No Known Allergies    HOME MEDICATIONS:   Gabapentin    CURRENT MEDICATIONS  MEDICATIONS (STANDING):   MEDICATIONS (PRN):  --------------------------------------------------------------------------------------------    Vitals:   T(C): 37.1 (10-12-20 @ 18:06), Max: 37.1 (10-12-20 @ 18:06)  HR: 96 (10-12-20 @ 19:13) (96 - 120)  BP: 146/87 (10-12-20 @ 19:13) (146/87 - 152/90)  RR: 16 (10-12-20 @ 19:13) (16 - 20)  SpO2: 100% (10-12-20 @ 19:13) (100% - 100%)  CAPILLARY BLOOD GLUCOSE          Height (cm): 165.1 (10-12 @ 18:06)  Weight (kg): 58.5 (10-12 @ 18:06)  BMI (kg/m2): 21.5 (10-12 @ 18:06)  BSA (m2): 1.64 (10-12 @ 18:06)      PHYSICAL EXAM:   General: NAD, Lying in bed comfortably  Neuro: A+Ox3  HEENT: NC/AT, EOMI  Neck: Soft, supple  Cardio: RRR, nml S1/S2  Resp: Good effort, CTA b/l  Breast: No lesions/masses, no drainage  GI/Abd: Soft, NT/ND, no rebound/guarding, no masses palpated  Skin: Intact, no breakdown  Musculoskeletal: All 4 extremities moving spontaneously, no limitations  --------------------------------------------------------------------------------------------    LABS  CBC (10-12 @ 20:35)                              12.4                           5.04    )----------------(  307        73.4  % Neutrophils, 20.2  % Lymphocytes, ANC: 3.70                                37.4      BMP (10-12 @ 20:35)             140     |  103     |  8     		Ca++ --      Ca 9.3                ---------------------------------( 93    		Mg --                 3.6     |  24      |  0.60  			Ph --        LFTs (10-12 @ 20:35)      TPro 6.4 / Alb 3.8 / TBili 0.3 / DBili -- / AST 35 / ALT 34 / AlkPhos 105    Coags (10-12 @ 20:35)  aPTT 31.6 / INR 1.12 / PT 13.4          --------------------------------------------------------------------------------------------    MICROBIOLOGY      --------------------------------------------------------------------------------------------    IMAGING  ***    --------------------------------------------------------------------------------------------     GENERAL SURGERY CONSULT NOTE  --------------------------------------------------------------------------------------------  HPI:   Patient is a 50y old Female with history of left breast cancer s/p mastectomy and KAREEM flap currently on Chemo who presents with a chief complaint of rectal pain. Patient reports she visited the ED in [facility] for constipation and disimpaction but was unsuccessful 2/2 to pain and reportedly found to have internal hemorrhoids. Patient currently reports mild abdominal pain passing flatus.     PAST MEDICAL & SURGICAL HISTORY:  Breast cancer  left  breast, completed chemotherapy 2018    History of  section  ,     History of tonsillectomy      FAMILY HISTORY:  No pertinent family history in first degree relatives    SOCIAL HISTORY:    Denies smoking, reports rare EtOH use    ALLERGIES: No Known Allergies    HOME MEDICATIONS:   Gabapentin    CURRENT MEDICATIONS  MEDICATIONS (STANDING):   MEDICATIONS (PRN):  --------------------------------------------------------------------------------------------    Vitals:   T(C): 37.1 (10-12-20 @ 18:06), Max: 37.1 (10-12-20 @ 18:06)  HR: 96 (10-12-20 @ 19:13) (96 - 120)  BP: 146/87 (10-12-20 @ 19:13) (146/87 - 152/90)  RR: 16 (10-12-20 @ 19:13) (16 - 20)  SpO2: 100% (10-12-20 @ 19:13) (100% - 100%)  CAPILLARY BLOOD GLUCOSE          Height (cm): 165.1 (10-12 @ 18:06)  Weight (kg): 58.5 (10-12 @ 18:06)  BMI (kg/m2): 21.5 (10-12 @ 18:06)  BSA (m2): 1.64 (10-12 @ 18:06)      PHYSICAL EXAM:   General: NAD, Lying in bed comfortably  Neuro: A+Ox3  HEENT: NC/AT, EOMI  Neck: Soft, supple  Cardio: RRR, nml S1/S2  Resp: Good effort, CTA b/l  Breast: No lesions/masses, no drainage  GI/Abd: Soft, NT/ND, no rebound/guarding, no masses palpated  Skin: Intact, no breakdown  Musculoskeletal: All 4 extremities moving spontaneously, no limitations  --------------------------------------------------------------------------------------------    LABS  CBC (10-12 @ 20:35)                              12.4                           5.04    )----------------(  307        73.4  % Neutrophils, 20.2  % Lymphocytes, ANC: 3.70                                37.4      BMP (10-12 @ 20:35)             140     |  103     |  8     		Ca++ --      Ca 9.3                ---------------------------------( 93    		Mg --                 3.6     |  24      |  0.60  			Ph --        LFTs (10-12 @ 20:35)      TPro 6.4 / Alb 3.8 / TBili 0.3 / DBili -- / AST 35 / ALT 34 / AlkPhos 105    Coags (10-12 @ 20:35)  aPTT 31.6 / INR 1.12 / PT 13.4          --------------------------------------------------------------------------------------------    MICROBIOLOGY      --------------------------------------------------------------------------------------------    IMAGING  ***    --------------------------------------------------------------------------------------------

## 2020-10-12 NOTE — ED ADULT TRIAGE NOTE - CHIEF COMPLAINT QUOTE
"I have constipation since Friday night. Yesterday I went to the hospital and I am still feeling very uncomfortable" Cancer patient.

## 2020-10-12 NOTE — ED ADULT NURSE NOTE - CHPI ED NUR SYMPTOMS NEG
no vomiting/no chills/no blood in stool/no diarrhea/no fever/no hematuria/no nausea/no burning urination/no dysuria/no abdominal distension

## 2020-10-12 NOTE — CONSULT NOTE ADULT - ASSESSMENT
Patient is a 50yf pmhx ***    PLAN:  ***  -   -   -   - Plan to be discussed with Attending,  Patient is a 50y old Female with history of left breast cancer s/p mastectomy and KAREEM flap currently on Chemo who presents with a chief complaint of rectal pain with CT showing large stool burden.     PLAN:    - No surgical intervention at this time   - Can discharge home with Proctozone, lidocaine gel, PO Miralax, dulcolax suppositories, senna  - F/u with Dr. Pearce tomorrow in office for disimpaction  - Plan to be discussed with Attending, Dr. Josefa Blackwood MD  General Surgery, PGY 2

## 2020-10-12 NOTE — ED ADULT NURSE NOTE - PAIN RATING/NUMBER SCALE (0-10): REST
Patient presents with:  Sore Throat      HPI:     Lilo is a 8year old female who presents with fever and sore throat which started yesterday. Patient vomited yesterday was able to tolerate oral fluids today.   The patient has not been on any antibiotics Tablet Dispersible   Sig: Take 0.5 tablets (2 mg total) by mouth every 4 (four) hours as needed (prn vomiting). Dispense:  10 tablet   Refill:  0    Labs performed this visit:  Patient had negative rapid strep screen  Diagnosis:    ICD-10-CM    1.  Acute 5

## 2020-10-12 NOTE — ED ADULT NURSE NOTE - OBJECTIVE STATEMENT
50 year old female presented to ED from home with c/o of lower abdominal pressure due to constipation since Friday. pt went to [facility] yesterday, attempted manual discompaction without success, x-ray and CT scan showed no bowel obstruction but a high amount of stool. hx breast ca, last chemo at [facility] through meta port on right chest on Thursday, bilateral arm lymphedema. pt denies CP, SOB, nausea/vomiting, numbness/tingling, fever, cough, chills, dizziness, headache, blurred vision, neuro intact. pt a&ox3, lung sounds clear, heart rate regular, abdomen soft nontender nondistended to palp. skin intact. pt currently resting in bed comfortably with RN at bedside. Will continue to monitor and assess while offering support and reassurance.

## 2020-10-12 NOTE — CONSULT NOTE ADULT - CONSULT REQUESTED BY NAME
ED no outdoor environmental allergies/no indoor environmental allergies/no reactions to medicines/no reactions to food

## 2020-10-12 NOTE — ED PROVIDER NOTE - PROGRESS NOTE DETAILS
labs are unremarkable.  pt evaluated by surgery and recommend d/c and pt to see Dr. Pearce tomorrow.  [facility].

## 2020-10-12 NOTE — ED PROVIDER NOTE - CLINICAL SUMMARY MEDICAL DECISION MAKING FREE TEXT BOX
Attn - pt with Breast cancer getting chemo - severe constipation and attempted disimpaction yesterday that exacerbated her hemorrhoids and sent to ER for disimpaction under anesthesia per pt.  Contact surgery/Dr. Phelan for plan.

## 2020-10-12 NOTE — ED PROVIDER NOTE - CARE PLAN
Principal Discharge DX:	Constipation, unspecified constipation type  Secondary Diagnosis:	Breast cancer

## 2020-10-13 VITALS — BODY MASS INDEX: 21.49 KG/M2 | HEIGHT: 65 IN | WEIGHT: 129 LBS

## 2020-10-13 LAB — SARS-COV-2 RNA SPEC QL NAA+PROBE: SIGNIFICANT CHANGE UP

## 2020-10-13 RX ORDER — CEFADROXIL 500 MG/1
500 CAPSULE ORAL TWICE DAILY
Qty: 14 | Refills: 1 | Status: DISCONTINUED | COMMUNITY
Start: 2019-03-07 | End: 2020-10-13

## 2020-10-13 RX ORDER — GEMCITABINE HYDROCHLORIDE 1 G/25ML
INJECTION, POWDER, LYOPHILIZED, FOR SOLUTION INTRAVENOUS
Refills: 0 | Status: ACTIVE | COMMUNITY

## 2020-10-13 RX ORDER — GABAPENTIN 100 MG/1
CAPSULE ORAL
Refills: 0 | Status: ACTIVE | COMMUNITY

## 2020-10-14 ENCOUNTER — APPOINTMENT (OUTPATIENT)
Dept: COLORECTAL SURGERY | Facility: CLINIC | Age: 50
End: 2020-10-14
Payer: COMMERCIAL

## 2020-10-14 PROCEDURE — 45915 REMOVE RECTAL OBSTRUCTION: CPT

## 2020-12-21 PROBLEM — Z01.419 ENCOUNTER FOR GYNECOLOGICAL EXAMINATION: Status: RESOLVED | Noted: 2019-02-25 | Resolved: 2020-12-21

## 2021-09-14 ENCOUNTER — APPOINTMENT (OUTPATIENT)
Dept: HEMATOLOGY ONCOLOGY | Facility: CLINIC | Age: 51
End: 2021-09-14

## 2021-09-23 NOTE — ED ADULT NURSE REASSESSMENT NOTE - NS ED NURSE REASSESS COMMENT FT1
pt in stretcher. Appears to be comfortable. A&Ox3. MAEx4. c/o minor pain to shoulder and mid chest. Breathing spontaneously on RA. VS as documented. All needs are met and safety maintained. will continue to monitor. no

## 2022-04-05 NOTE — ASU PREOP CHECKLIST - NS PREOP CHK HIBICLENS NA
[Courtesy Letter] :      I had the pleasure of seeing your patient, [unfilled], in my office today.  [Today's Evaluation] : today's evaluation N/A

## 2022-05-05 NOTE — PATIENT PROFILE ADULT. - NS PRO ABUSE SCREEN SUSPICION NEGLECT YN
[FreeTextEntry1] : 44 yo P2 here for f/u after USG.\par Report reviewed w/patient.\par H/o  x 2\par \par Plan\par Pt will find and send results of EM bx from 2021.\par Schedule vNOTES TL\par RTO for preop chat
no

## 2022-08-09 NOTE — BRIEF OPERATIVE NOTE - PROCEDURE
KAREEM flap, free  01/24/2018    Active  KESHIA <<-----Click on this checkbox to enter Procedure Ear Star Wedge Flap Text: Because of the tightness of the surrounding skin, the full-thickness nature of the defect with exposed cartilage, and to avoid deformity, a star wedge advancement flap was planed.  After prep and anesthesia, a full-thickness triangular wedge of tissue was excised, as well as two brows triangles on either side of this to reduce cupping deformity. The chondrocutaneous flaps were then advanced and closed in a layered fashion.

## 2023-01-11 NOTE — ED ADULT NURSE NOTE - NSIMPLEMENTINTERV_GEN_ALL_ED
Implemented All Universal Safety Interventions:  [place] to call system. Call bell, personal items and telephone within reach. Instruct patient to call for assistance. Room bathroom lighting operational. Non-slip footwear when patient is off stretcher. Physically safe environment: no spills, clutter or unnecessary equipment. Stretcher in lowest position, wheels locked, appropriate side rails in place. Bactrim Pregnancy And Lactation Text: This medication is Pregnancy Category D and is known to cause fetal risk.  It is also excreted in breast milk.

## 2023-03-10 NOTE — PATIENT PROFILE ADULT. - HEALTHCARE QUESTIONS, PROFILE
Procedure     ECG 12 Lead    Date/Time: 3/10/2023 9:00 AM  Performed by: Libby Soliman APRN  Authorized by: Libby Soliman APRN   Comparison: compared with previous ECG from 3/9/2023  Comparison to previous ECG: Previous SVT, current NSR  Rhythm: sinus rhythm  Rate: normal  BPM: 79               
denies

## 2023-05-22 NOTE — DISCHARGE NOTE ADULT - CARE PLAN
She has labs that were ordered/ extended today (Feb orders , were to be extended then done)  Principal Discharge DX:	Breast cancer  Goal:	Post operative recovery  Assessment and plan of treatment:	Left mastectomy and bilateral KAREEM flap reconstruction.

## 2023-06-09 NOTE — ED ADULT TRIAGE NOTE - TEMPERATURE IN CELSIUS (DEGREES C)
Good fetal movement  No contractions, no reflux  Reviewed normal Glucola and Hgb  Follow up placenta and growth with US next visit  Tdap in office today   labor precautions    Diagnoses and all orders for this visit:    1. Low lying placenta, antepartum (Primary)         36.7

## 2023-11-30 NOTE — PATIENT PROFILE ADULT. - NS PRO AD NO ADVANCE DIRECTIVE
Discontinue Regimen: 5FU-Apply a pea sized amount to the affected area of right arm once daily at bedtime X 4 weeks. Do NOT cover with a bandaid.
Detail Level: Zone
Render In Strict Bullet Format?: No
Discontinue Regimen: 5FU-Apply a pea sized amount to the affected area of right arm once daily at bedtime X 4 weeks. Do NOT cover with a bandaid
No

## 2024-01-12 NOTE — ED PROVIDER NOTE - TOBACCO USE
Subjective   Patient ID: Eusebio Marroquin is a 78 y.o. male who presents for Medication Visit and Hospital Follow-up.    HPI reviewed records of recent hospitalizations and emergency room visits.  Reviewed chest x-rays CAT scans and labs he was in the hospital in the middle of last month for syncopal episode.  Cared for at [facility].  Here with his wife and we tried to piece together he has an echocardiogram scheduled coming up but they could not get to when he was in the hospital 1 month ago further I see he has a cardiology appointment at the end of the month.  He has completed medications for his pneumonia.  He had a CAT scan to rule out a space-occupying endobronchial lesion or a foreign body and I do not see any reason for a 6-week follow-up chest x-ray  Wife says he is back to his regular self  Continues at half pack per day smoking cessation advised    Review of Systems  No fever chills nausea vomiting.  Stable activity tolerance and capacity  Objective   /66   Pulse 68   Ht 1.829 m (6')   Wt 74.7 kg (164 lb 11.2 oz)   SpO2 92%   BMI 22.34 kg/m²     Physical Exam  No bruit thyroid nontender.  Heart regular no murmur.  Lungs clear to auscultation.  No edema.  Assessment/Plan   Problem List Items Addressed This Visit             ICD-10-CM    Stage 3a chronic kidney disease (CMS/HCC) N18.31    Pulmonary emphysema, unspecified emphysema type (CMS/HCC) - Primary J43.9    Peripheral vascular disease, unspecified (CMS/HCC) I73.9     Keep future appointment      Never smoker

## 2024-09-03 NOTE — PATIENT PROFILE ADULT. - TRANSFUSION REACTION, PREVIOUS, PROFILE
Doxepin Counseling:  Patient advised that the medication is sedating and not to drive a car after taking this medication. Patient informed of potential adverse effects including but not limited to dry mouth, urinary retention, and blurry vision.  The patient verbalized understanding of the proper use and possible adverse effects of doxepin.  All of the patient's questions and concerns were addressed. Doxycycline Pregnancy And Lactation Text: This medication is Pregnancy Category D and not consider safe during pregnancy. It is also excreted in breast milk but is considered safe for shorter treatment courses. no Sotyktu Counseling:  I discussed the most common side effects of Sotyktu including: common cold, sore throat, sinus infections, cold sores, canker sores, folliculitis, and acne.  I also discussed more serious side effects of Sotyktu including but not limited to: serious allergic reactions; increased risk for infections such as TB; cancers such as lymphomas; rhabdomyolysis and elevated CPK; and elevated triglycerides and liver enzymes.  Nsaids Pregnancy And Lactation Text: These medications are considered safe up to 30 weeks gestation. It is excreted in breast milk. Opzelura Counseling:  I discussed with the patient the risks of Opzelura including but not limited to nasopharngitis, bronchitis, ear infection, eosinophila, hives, diarrhea, folliculitis, tonsillitis, and rhinorrhea.  Taken orally, this medication has been linked to serious infections; higher rate of mortality; malignancy and lymphoproliferative disorders; major adverse cardiovascular events; thrombosis; thrombocytopenia, anemia, and neutropenia; and lipid elevations. Humira Counseling:  I discussed with the patient the risks of adalimumab including but not limited to myelosuppression, immunosuppression, autoimmune hepatitis, demyelinating diseases, lymphoma, and serious infections.  The patient understands that monitoring is required including a PPD at baseline and must alert us or the primary physician if symptoms of infection or other concerning signs are noted. Isotretinoin Pregnancy And Lactation Text: This medication is Pregnancy Category X and is considered extremely dangerous during pregnancy. It is unknown if it is excreted in breast milk. Bimzelx Pregnancy And Lactation Text: This medication crosses the placenta and the safety is uncertain during pregnancy. It is unknown if this medication is present in breast milk. Birth Control Pills Counseling: Birth Control Pill Counseling: I discussed with the patient the potential side effects of OCPs including but not limited to increased risk of stroke, heart attack, thrombophlebitis, deep venous thrombosis, hepatic adenomas, breast changes, GI upset, headaches, and depression.  The patient verbalized understanding of the proper use and possible adverse effects of OCPs. All of the patient's questions and concerns were addressed. Solaraze Pregnancy And Lactation Text: This medication is Pregnancy Category B and is considered safe. There is some data to suggest avoiding during the third trimester. It is unknown if this medication is excreted in breast milk. Arava Pregnancy And Lactation Text: This medication is Pregnancy Category X and is absolutely contraindicated during pregnancy. It is unknown if it is excreted in breast milk. Taltz Pregnancy And Lactation Text: The risk during pregnancy and breastfeeding is uncertain with this medication. Griseofulvin Pregnancy And Lactation Text: This medication is Pregnancy Category X and is known to cause serious birth defects. It is unknown if this medication is excreted in breast milk but breast feeding should be avoided. Glycopyrrolate Counseling:  I discussed with the patient the risks of glycopyrrolate including but not limited to skin rash, drowsiness, dry mouth, difficulty urinating, and blurred vision. Siliq Counseling:  I discussed with the patient the risks of Siliq including but not limited to new or worsening depression, suicidal thoughts and behavior, immunosuppression, malignancy, posterior leukoencephalopathy syndrome, and serious infections.  The patient understands that monitoring is required including a PPD at baseline and must alert us or the primary physician if symptoms of infection or other concerning signs are noted. There is also a special program designed to monitor depression which is required with Siliq. Rifampin Pregnancy And Lactation Text: This medication is Pregnancy Category C and it isn't know if it is safe during pregnancy. It is also excreted in breast milk and should not be used if you are breast feeding. Tazorac Counseling:  Patient advised that medication is irritating and drying.  Patient may need to apply sparingly and wash off after an hour before eventually leaving it on overnight.  The patient verbalized understanding of the proper use and possible adverse effects of tazorac.  All of the patient's questions and concerns were addressed. Hydroxyzine Pregnancy And Lactation Text: This medication is not safe during pregnancy and should not be taken. It is also excreted in breast milk and breast feeding isn't recommended. Azithromycin Counseling:  I discussed with the patient the risks of azithromycin including but not limited to GI upset, allergic reaction, drug rash, diarrhea, and yeast infections. Wartpeel Pregnancy And Lactation Text: This medication is Pregnancy Category X and contraindicated in pregnancy and in women who may become pregnant. It is unknown if this medication is excreted in breast milk. Odomzo Counseling- I discussed with the patient the risks of Odomzo including but not limited to nausea, vomiting, diarrhea, constipation, weight loss, changes in the sense of taste, decreased appetite, muscle spasms, and hair loss.  The patient verbalized understanding of the proper use and possible adverse effects of Odomzo.  All of the patient's questions and concerns were addressed. Olanzapine Counseling- I discussed with the patient the common side effects of olanzapine including but are not limited to: lack of energy, dry mouth, increased appetite, sleepiness, tremor, constipation, dizziness, changes in behavior, or restlessness.  Explained that teenagers are more likely to experience headaches, abdominal pain, pain in the arms or legs, tiredness, and sleepiness.  Serious side effects include but are not limited: increased risk of death in elderly patients who are confused, have memory loss, or dementia-related psychosis; hyperglycemia; increased cholesterol and triglycerides; and weight gain. Eucrisa Pregnancy And Lactation Text: This medication has not been assigned a Pregnancy Risk Category but animal studies failed to show danger with the topical medication. It is unknown if the medication is excreted in breast milk. Calcipotriene Counseling:  I discussed with the patient the risks of calcipotriene including but not limited to erythema, scaling, itching, and irritation. Propranolol Pregnancy And Lactation Text: This medication is Pregnancy Category C and it isn't known if it is safe during pregnancy. It is excreted in breast milk. Tremfya Counseling: I discussed with the patient the risks of guselkumab including but not limited to immunosuppression, serious infections, and drug reactions.  The patient understands that monitoring is required including a PPD at baseline and must alert us or the primary physician if symptoms of infection or other concerning signs are noted. Cibinqo Counseling: I discussed with the patient the risks of Cibinqo therapy including but not limited to common cold, nausea, headache, cold sores, increased blood CPK levels, dizziness, UTIs, fatigue, acne, and vomitting. Live vaccines should be avoided.  This medication has been linked to serious infections; higher rate of mortality; malignancy and lymphoproliferative disorders; major adverse cardiovascular events; thrombosis; thrombocytopenia and lymphopenia; lipid elevations; and retinal detachment. Cimzia Counseling:  I discussed with the patient the risks of Cimzia including but not limited to immunosuppression, allergic reactions and infections.  The patient understands that monitoring is required including a PPD at baseline and must alert us or the primary physician if symptoms of infection or other concerning signs are noted. Cellcept Pregnancy And Lactation Text: This medication is Pregnancy Category D and isn't considered safe during pregnancy. It is unknown if this medication is excreted in breast milk. Humira Pregnancy And Lactation Text: This medication is Pregnancy Category B and is considered safe during pregnancy. It is unknown if this medication is excreted in breast milk. Hydroquinone Counseling:  Patient advised that medication may result in skin irritation, lightening (hypopigmentation), dryness, and burning.  In the event of skin irritation, the patient was advised to reduce the amount of the drug applied or use it less frequently.  Rarely, spots that are treated with hydroquinone can become darker (pseudoochronosis).  Should this occur, patient instructed to stop medication and call the office. The patient verbalized understanding of the proper use and possible adverse effects of hydroquinone.  All of the patient's questions and concerns were addressed. Glycopyrrolate Pregnancy And Lactation Text: This medication is Pregnancy Category B and is considered safe during pregnancy. It is unknown if it is excreted breast milk. Use Enhanced Medication Counseling?: No Sotyktu Pregnancy And Lactation Text: There is insufficient data to evaluate whether or not Sotyktu is safe to use during pregnancy.   It is not known if Sotyktu passes into breast milk and whether or not it is safe to use when breastfeeding.   Itraconazole Counseling:  I discussed with the patient the risks of itraconazole including but not limited to liver damage, nausea/vomiting, neuropathy, and severe allergy.  The patient understands that this medication is best absorbed when taken with acidic beverages such as non-diet cola or ginger ale.  The patient understands that monitoring is required including baseline LFTs and repeat LFTs at intervals.  The patient understands that they are to contact us or the primary physician if concerning signs are noted. Azithromycin Pregnancy And Lactation Text: This medication is considered safe during pregnancy and is also secreted in breast milk. Doxepin Pregnancy And Lactation Text: This medication is Pregnancy Category C and it isn't known if it is safe during pregnancy. It is also excreted in breast milk and breast feeding isn't recommended. Winlevi Counseling:  I discussed with the patient the risks of topical clascoterone including but not limited to erythema, scaling, itching, and stinging. Patient voiced their understanding. Erythromycin Counseling:  I discussed with the patient the risks of erythromycin including but not limited to GI upset, allergic reaction, drug rash, diarrhea, increase in liver enzymes, and yeast infections. Clofazimine Counseling:  I discussed with the patient the risks of clofazimine including but not limited to skin and eye pigmentation, liver damage, nausea/vomiting, gastrointestinal bleeding and allergy. Birth Control Pills Pregnancy And Lactation Text: This medication should be avoided if pregnant and for the first 30 days post-partum. High Dose Vitamin A Counseling: Side effects reviewed, pt to contact office should one occur. Opzelura Pregnancy And Lactation Text: There is insufficient data to evaluate drug-associated risk for major birth defects, miscarriage, or other adverse maternal or fetal outcomes.  There is a pregnancy registry that monitors pregnancy outcomes in pregnant persons exposed to the medication during pregnancy.  It is unknown if this medication is excreted in breast milk.  Do not breastfeed during treatment and for about 4 weeks after the last dose. Sarecycline Counseling: Patient advised regarding possible photosensitivity and discoloration of the teeth, skin, lips, tongue and gums.  Patient instructed to avoid sunlight, if possible.  When exposed to sunlight, patients should wear protective clothing, sunglasses, and sunscreen.  The patient was instructed to call the office immediately if the following severe adverse effects occur:  hearing changes, easy bruising/bleeding, severe headache, or vision changes.  The patient verbalized understanding of the proper use and possible adverse effects of sarecycline.  All of the patient's questions and concerns were addressed. Itraconazole Pregnancy And Lactation Text: This medication is Pregnancy Category C and it isn't know if it is safe during pregnancy. It is also excreted in breast milk. SSKI Counseling:  I discussed with the patient the risks of SSKI including but not limited to thyroid abnormalities, metallic taste, GI upset, fever, headache, acne, arthralgias, paraesthesias, lymphadenopathy, easy bleeding, arrhythmias, and allergic reaction. Erythromycin Pregnancy And Lactation Text: This medication is Pregnancy Category B and is considered safe during pregnancy. It is also excreted in breast milk. Tazorac Pregnancy And Lactation Text: This medication is not safe during pregnancy. It is unknown if this medication is excreted in breast milk. Cyclophosphamide Counseling:  I discussed with the patient the risks of cyclophosphamide including but not limited to hair loss, hormonal abnormalities, decreased fertility, abdominal pain, diarrhea, nausea and vomiting, bone marrow suppression and infection. The patient understands that monitoring is required while taking this medication. Calcipotriene Pregnancy And Lactation Text: This medication has not been proven safe during pregnancy. It is unknown if this medication is excreted in breast milk. Albendazole Counseling:  I discussed with the patient the risks of albendazole including but not limited to cytopenia, kidney damage, nausea/vomiting and severe allergy.  The patient understands that this medication is being used in an off-label manner. Hyrimoz Counseling:  I discussed with the patient the risks of adalimumab including but not limited to myelosuppression, immunosuppression, autoimmune hepatitis, demyelinating diseases, lymphoma, and serious infections.  The patient understands that monitoring is required including a PPD at baseline and must alert us or the primary physician if symptoms of infection or other concerning signs are noted. Hydroxychloroquine Counseling:  I discussed with the patient that a baseline ophthalmologic exam is needed at the start of therapy and every year thereafter while on therapy. A CBC may also be warranted for monitoring.  The side effects of this medication were discussed with the patient, including but not limited to agranulocytosis, aplastic anemia, seizures, rashes, retinopathy, and liver toxicity. Patient instructed to call the office should any adverse effect occur.  The patient verbalized understanding of the proper use and possible adverse effects of Plaquenil.  All the patient's questions and concerns were addressed. Olanzapine Pregnancy And Lactation Text: This medication is pregnancy category C.   There are no adequate and well controlled trials with olanzapine in pregnant females.  Olanzapine should be used during pregnancy only if the potential benefit justifies the potential risk to the fetus.   In a study in lactating healthy women, olanzapine was excreted in breast milk.  It is recommended that women taking olanzapine should not breast feed. Cibinqo Pregnancy And Lactation Text: It is unknown if this medication will adversely affect pregnancy or breast feeding.  You should not take this medication if you are currently pregnant or planning a pregnancy or while breastfeeding. Winlevi Pregnancy And Lactation Text: This medication is considered safe during pregnancy and breastfeeding. Cimzia Pregnancy And Lactation Text: This medication crosses the placenta but can be considered safe in certain situations. Cimzia may be excreted in breast milk. Clofazimine Pregnancy And Lactation Text: This medication is Pregnancy Category C and isn't considered safe during pregnancy. It is excreted in breast milk. Spironolactone Counseling: Patient advised regarding risks of diarrhea, abdominal pain, hyperkalemia, birth defects (for female patients), liver toxicity and renal toxicity. The patient may need blood work to monitor liver and kidney function and potassium levels while on therapy. The patient verbalized understanding of the proper use and possible adverse effects of spironolactone.  All of the patient's questions and concerns were addressed. Xeljanz Counseling: I discussed with the patient the risks of Xeljanz therapy including increased risk of infection, liver issues, headache, diarrhea, or cold symptoms. Live vaccines should be avoided. They were instructed to call if they have any problems. Sski Pregnancy And Lactation Text: This medication is Pregnancy Category D and isn't considered safe during pregnancy. It is excreted in breast milk. Topical Clindamycin Counseling: Patient counseled that this medication may cause skin irritation or allergic reactions.  In the event of skin irritation, the patient was advised to reduce the amount of the drug applied or use it less frequently.   The patient verbalized understanding of the proper use and possible adverse effects of clindamycin.  All of the patient's questions and concerns were addressed. Picato Counseling:  I discussed with the patient the risks of Picato including but not limited to erythema, scaling, itching, weeping, crusting, and pain. Simponi Counseling:  I discussed with the patient the risks of golimumab including but not limited to myelosuppression, immunosuppression, autoimmune hepatitis, demyelinating diseases, lymphoma, and serious infections.  The patient understands that monitoring is required including a PPD at baseline and must alert us or the primary physician if symptoms of infection or other concerning signs are noted. High Dose Vitamin A Pregnancy And Lactation Text: High dose vitamin A therapy is contraindicated during pregnancy and breast feeding. Bactrim Counseling:  I discussed with the patient the risks of sulfa antibiotics including but not limited to GI upset, allergic reaction, drug rash, diarrhea, dizziness, photosensitivity, and yeast infections.  Rarely, more serious reactions can occur including but not limited to aplastic anemia, agranulocytosis, methemoglobinemia, blood dyscrasias, liver or kidney failure, lung infiltrates or desquamative/blistering drug rashes. Detail Level: Detailed Picato Pregnancy And Lactation Text: This medication is Pregnancy Category C. It is unknown if this medication is excreted in breast milk. Oral Minoxidil Counseling- I discussed with the patient the risks of oral minoxidil including but not limited to shortness of breath, swelling of the feet or ankles, dizziness, lightheadedness, unwanted hair growth and allergic reaction.  The patient verbalized understanding of the proper use and possible adverse effects of oral minoxidil.  All of the patient's questions and concerns were addressed. Metronidazole Counseling:  I discussed with the patient the risks of metronidazole including but not limited to seizures, nausea/vomiting, a metallic taste in the mouth, nausea/vomiting and severe allergy. 5-Fu Counseling: 5-Fluorouracil Counseling:  I discussed with the patient the risks of 5-fluorouracil including but not limited to erythema, scaling, itching, weeping, crusting, and pain. Colchicine Counseling:  Patient counseled regarding adverse effects including but not limited to stomach upset (nausea, vomiting, stomach pain, or diarrhea).  Patient instructed to limit alcohol consumption while taking this medication.  Colchicine may reduce blood counts especially with prolonged use.  The patient understands that monitoring of kidney function and blood counts may be required, especially at baseline. The patient verbalized understanding of the proper use and possible adverse effects of colchicine.  All of the patient's questions and concerns were addressed. Xeljay jayz Pregnancy And Lactation Text: This medication is Pregnancy Category D and is not considered safe during pregnancy.  The risk during breast feeding is also uncertain. Sarecycline Pregnancy And Lactation Text: This medication is Pregnancy Category D and not consider safe during pregnancy. It is also excreted in breast milk. Ketoconazole Counseling:   Patient counseled regarding improving absorption with orange juice.  Adverse effects include but are not limited to breast enlargement, headache, diarrhea, nausea, upset stomach, liver function test abnormalities, taste disturbance, and stomach pain.  There is a rare possibility of liver failure that can occur when taking ketoconazole. The patient understands that monitoring of LFTs may be required, especially at baseline. The patient verbalized understanding of the proper use and possible adverse effects of ketoconazole.  All of the patient's questions and concerns were addressed. Cyclophosphamide Pregnancy And Lactation Text: This medication is Pregnancy Category D and it isn't considered safe during pregnancy. This medication is excreted in breast milk. Hydroxychloroquine Pregnancy And Lactation Text: This medication has been shown to cause fetal harm but it isn't assigned a Pregnancy Risk Category. There are small amounts excreted in breast milk. Albendazole Pregnancy And Lactation Text: This medication is Pregnancy Category C and it isn't known if it is safe during pregnancy. It is also excreted in breast milk. Imiquimod Counseling:  I discussed with the patient the risks of imiquimod including but not limited to erythema, scaling, itching, weeping, crusting, and pain.  Patient understands that the inflammatory response to imiquimod is variable from person to person and was educated regarded proper titration schedule.  If flu-like symptoms develop, patient knows to discontinue the medication and contact us. Spironolactone Pregnancy And Lactation Text: This medication can cause feminization of the male fetus and should be avoided during pregnancy. The active metabolite is also found in breast milk. Bactrim Pregnancy And Lactation Text: This medication is Pregnancy Category D and is known to cause fetal risk.  It is also excreted in breast milk. Cosentyx Counseling:  I discussed with the patient the risks of Cosentyx including but not limited to worsening of Crohn's disease, immunosuppression, allergic reactions and infections.  The patient understands that monitoring is required including a PPD at baseline and must alert us or the primary physician if symptoms of infection or other concerning signs are noted. Zyclara Counseling:  I discussed with the patient the risks of imiquimod including but not limited to erythema, scaling, itching, weeping, crusting, and pain.  Patient understands that the inflammatory response to imiquimod is variable from person to person and was educated regarded proper titration schedule.  If flu-like symptoms develop, patient knows to discontinue the medication and contact us. Tetracycline Counseling: Patient counseled regarding possible photosensitivity and increased risk for sunburn.  Patient instructed to avoid sunlight, if possible.  When exposed to sunlight, patients should wear protective clothing, sunglasses, and sunscreen.  The patient was instructed to call the office immediately if the following severe adverse effects occur:  hearing changes, easy bruising/bleeding, severe headache, or vision changes.  The patient verbalized understanding of the proper use and possible adverse effects of tetracycline.  All of the patient's questions and concerns were addressed. Patient understands to avoid pregnancy while on therapy due to potential birth defects. Litfulo Counseling: Litfulo (Ritlecitinib) Counseling: I discussed with the patient the risks of Litfulo therapy including but not limited to headache, upper respiratory infections, nausea, diarrhea, acne, and urticaria. Live vaccines should be avoided.  This medication has been linked to serious infections; higher rate of mortality; malignancy and lymphoproliferative disorders; major adverse cardiovascular events; thrombosis; decreases in lymphocytes and platelets; liver enzyme elevations; and CPK elevations. Xolair Counseling:  Patient informed of potential adverse effects including but not limited to fever, muscle aches, rash and allergic reactions.  The patient verbalized understanding of the proper use and possible adverse effects of Xolair.  All of the patient's questions and concerns were addressed. Topical Clindamycin Pregnancy And Lactation Text: This medication is Pregnancy Category B and is considered safe during pregnancy. It is unknown if it is excreted in breast milk. Oral Minoxidil Pregnancy And Lactation Text: This medication should only be used when clearly needed if you are pregnant, attempting to become pregnant or breast feeding. Dutasteride Male Counseling: Dutasteride Counseling:  I discussed with the patient the risks of use of dutasteride including but not limited to decreased libido, decreased ejaculate volume, and gynecomastia. Women who can become pregnant should not handle medication.  All of the patient's questions and concerns were addressed. Thalidomide Counseling: I discussed with the patient the risks of thalidomide including but not limited to birth defects, anxiety, weakness, chest pain, dizziness, cough and severe allergy. Metronidazole Pregnancy And Lactation Text: This medication is Pregnancy Category B and considered safe during pregnancy.  It is also excreted in breast milk. Cyclosporine Counseling:  I discussed with the patient the risks of cyclosporine including but not limited to hypertension, gingival hyperplasia,myelosuppression, immunosuppression, liver damage, kidney damage, neurotoxicity, lymphoma, and serious infections. The patient understands that monitoring is required including baseline blood pressure, CBC, CMP, lipid panel and uric acid, and then 1-2 times monthly CMP and blood pressure. Ilumya Counseling: I discussed with the patient the risks of tildrakizumab including but not limited to immunosuppression, malignancy, posterior leukoencephalopathy syndrome, and serious infections.  The patient understands that monitoring is required including a PPD at baseline and must alert us or the primary physician if symptoms of infection or other concerning signs are noted. Ketoconazole Pregnancy And Lactation Text: This medication is Pregnancy Category C and it isn't know if it is safe during pregnancy. It is also excreted in breast milk and breast feeding isn't recommended. Azelaic Acid Counseling: Patient counseled that medicine may cause skin irritation and to avoid applying near the eyes.  In the event of skin irritation, the patient was advised to reduce the amount of the drug applied or use it less frequently.   The patient verbalized understanding of the proper use and possible adverse effects of azelaic acid.  All of the patient's questions and concerns were addressed. Litfulo Pregnancy And Lactation Text: There is insufficient data to evaluate whether or not Litfulo is safe to use during pregnancy.  Breastfeeding is not recommended during treatment. Xolair Pregnancy And Lactation Text: This medication is Pregnancy Category B and is considered safe during pregnancy. This medication is excreted in breast milk. Ivermectin Counseling:  Patient instructed to take medication on an empty stomach with a full glass of water.  Patient informed of potential adverse effects including but not limited to nausea, diarrhea, dizziness, itching, and swelling of the extremities or lymph nodes.  The patient verbalized understanding of the proper use and possible adverse effects of ivermectin.  All of the patient's questions and concerns were addressed. Low Dose Naltrexone Counseling- I discussed with the patient the potential risks and side effects of low dose naltrexone including but not limited to: more vivid dreams, headaches, nausea, vomiting, abdominal pain, fatigue, dizziness, and anxiety. Protopic Counseling: Patient may experience a mild burning sensation during topical application. Protopic is not approved in children less than 2 years of age. There have been case reports of hematologic and skin malignancies in patients using topical calcineurin inhibitors although causality is questionable. Terbinafine Counseling: Patient counseling regarding adverse effects of terbinafine including but not limited to headache, diarrhea, rash, upset stomach, liver function test abnormalities, itching, taste/smell disturbance, nausea, abdominal pain, and flatulence.  There is a rare possibility of liver failure that can occur when taking terbinafine.  The patient understands that a baseline LFT and kidney function test may be required. The patient verbalized understanding of the proper use and possible adverse effects of terbinafine.  All of the patient's questions and concerns were addressed. Dutasteride Female Counseling: Dutasteride Counseling:  I discussed with the patient the risks of use of dutasteride including but not limited to decreased libido and sexual dysfunction. Explained the teratogenic nature of the medication and stressed the importance of not getting pregnant during treatment. All of the patient's questions and concerns were addressed. Cephalexin Counseling: I counseled the patient regarding use of cephalexin as an antibiotic for prophylactic and/or therapeutic purposes. Cephalexin (commonly prescribed under brand name Keflex) is a cephalosporin antibiotic which is active against numerous classes of bacteria, including most skin bacteria. Side effects may include nausea, diarrhea, gastrointestinal upset, rash, hives, yeast infections, and in rare cases, hepatitis, kidney disease, seizures, fever, confusion, neurologic symptoms, and others. Patients with severe allergies to penicillin medications are cautioned that there is about a 10% incidence of cross-reactivity with cephalosporins. When possible, patients with penicillin allergies should use alternatives to cephalosporins for antibiotic therapy. Acitretin Counseling:  I discussed with the patient the risks of acitretin including but not limited to hair loss, dry lips/skin/eyes, liver damage, hyperlipidemia, depression/suicidal ideation, photosensitivity.  Serious rare side effects can include but are not limited to pancreatitis, pseudotumor cerebri, bony changes, clot formation/stroke/heart attack.  Patient understands that alcohol is contraindicated since it can result in liver toxicity and significantly prolong the elimination of the drug by many years. Azelaic Acid Pregnancy And Lactation Text: This medication is considered safe during pregnancy and breast feeding. Skyrizi Counseling: I discussed with the patient the risks of risankizumab-rzaa including but not limited to immunosuppression, and serious infections.  The patient understands that monitoring is required including a PPD at baseline and must alert us or the primary physician if symptoms of infection or other concerning signs are noted. Otezla Counseling: The side effects of Otezla were discussed with the patient, including but not limited to worsening or new depression, weight loss, diarrhea, nausea, upper respiratory tract infection, and headache. Patient instructed to call the office should any adverse effect occur.  The patient verbalized understanding of the proper use and possible adverse effects of Otezla.  All the patient's questions and concerns were addressed. Cyclosporine Pregnancy And Lactation Text: This medication is Pregnancy Category C and it isn't know if it is safe during pregnancy. This medication is excreted in breast milk. Azathioprine Counseling:  I discussed with the patient the risks of azathioprine including but not limited to myelosuppression, immunosuppression, hepatotoxicity, lymphoma, and infections.  The patient understands that monitoring is required including baseline LFTs, Creatinine, possible TPMP genotyping and weekly CBCs for the first month and then every 2 weeks thereafter.  The patient verbalized understanding of the proper use and possible adverse effects of azathioprine.  All of the patient's questions and concerns were addressed. Topical Ketoconazole Counseling: Patient counseled that this medication may cause skin irritation or allergic reactions.  In the event of skin irritation, the patient was advised to reduce the amount of the drug applied or use it less frequently.   The patient verbalized understanding of the proper use and possible adverse effects of ketoconazole.  All of the patient's questions and concerns were addressed. Drysol Counseling:  I discussed with the patient the risks of drysol/aluminum chloride including but not limited to skin rash, itching, irritation, burning. Olumiant Counseling: I discussed with the patient the risks of Olumiant therapy including but not limited to upper respiratory tract infections, shingles, cold sores, and nausea. Live vaccines should be avoided.  This medication has been linked to serious infections; higher rate of mortality; malignancy and lymphoproliferative disorders; major adverse cardiovascular events; thrombosis; gastrointestinal perforations; neutropenia; lymphopenia; anemia; liver enzyme elevations; and lipid elevations. Dupixent Counseling: I discussed with the patient the risks of dupilumab including but not limited to eye infection and irritation, cold sores, injection site reactions, worsening of asthma, allergic reactions and increased risk of parasitic infection.  Live vaccines should be avoided while taking dupilumab. Dupilumab will also interact with certain medications such as warfarin and cyclosporine. The patient understands that monitoring is required and they must alert us or the primary physician if symptoms of infection or other concerning signs are noted. Low Dose Naltrexone Pregnancy And Lactation Text: Naltrexone is pregnancy category C.  There have been no adequate and well-controlled studies in pregnant women.  It should be used in pregnancy only if the potential benefit justifies the potential risk to the fetus.   Limited data indicates that naltrexone is minimally excreted into breastmilk. Minoxidil Counseling: Minoxidil is a topical medication which can increase blood flow where it is applied. It is uncertain how this medication increases hair growth. Side effects are uncommon and include stinging and allergic reactions. Dutasteride Pregnancy And Lactation Text: This medication is absolutely contraindicated in women during pregnancy and breast feeding. Feminization of male fetuses is possible if taking while pregnant. Cephalexin Pregnancy And Lactation Text: This medication is Pregnancy Category B and considered safe during pregnancy.  It is also excreted in breast milk but can be used safely for shorter doses. Acitretin Pregnancy And Lactation Text: This medication is Pregnancy Category X and should not be given to women who are pregnant or may become pregnant in the future. This medication is excreted in breast milk. Protopic Pregnancy And Lactation Text: This medication is Pregnancy Category C. It is unknown if this medication is excreted in breast milk when applied topically. Minocycline Counseling: Patient advised regarding possible photosensitivity and discoloration of the teeth, skin, lips, tongue and gums.  Patient instructed to avoid sunlight, if possible.  When exposed to sunlight, patients should wear protective clothing, sunglasses, and sunscreen.  The patient was instructed to call the office immediately if the following severe adverse effects occur:  hearing changes, easy bruising/bleeding, severe headache, or vision changes.  The patient verbalized understanding of the proper use and possible adverse effects of minocycline.  All of the patient's questions and concerns were addressed. Dapsone Counseling: I discussed with the patient the risks of dapsone including but not limited to hemolytic anemia, agranulocytosis, rashes, methemoglobinemia, kidney failure, peripheral neuropathy, headaches, GI upset, and liver toxicity.  Patients who start dapsone require monitoring including baseline LFTs and weekly CBCs for the first month, then every month thereafter.  The patient verbalized understanding of the proper use and possible adverse effects of dapsone.  All of the patient's questions and concerns were addressed. Infliximab Counseling:  I discussed with the patient the risks of infliximab including but not limited to myelosuppression, immunosuppression, autoimmune hepatitis, demyelinating diseases, lymphoma, and serious infections.  The patient understands that monitoring is required including a PPD at baseline and must alert us or the primary physician if symptoms of infection or other concerning signs are noted. Tranexamic Acid Counseling:  Patient advised of the small risk of bleeding problems with tranexamic acid. They were also instructed to call if they developed any nausea, vomiting or diarrhea. All of the patient's questions and concerns were addressed. Erivedge Counseling- I discussed with the patient the risks of Erivedge including but not limited to nausea, vomiting, diarrhea, constipation, weight loss, changes in the sense of taste, decreased appetite, muscle spasms, and hair loss.  The patient verbalized understanding of the proper use and possible adverse effects of Erivedge.  All of the patient's questions and concerns were addressed. Methotrexate Counseling:  Patient counseled regarding adverse effects of methotrexate including but not limited to nausea, vomiting, abnormalities in liver function tests. Patients may develop mouth sores, rash, diarrhea, and abnormalities in blood counts. The patient understands that monitoring is required including LFT's and blood counts.  There is a rare possibility of scarring of the liver and lung problems that can occur when taking methotrexate. Persistent nausea, loss of appetite, pale stools, dark urine, cough, and shortness of breath should be reported immediately. Patient advised to discontinue methotrexate treatment at least three months before attempting to become pregnant.  I discussed the need for folate supplements while taking methotrexate.  These supplements can decrease side effects during methotrexate treatment. The patient verbalized understanding of the proper use and possible adverse effects of methotrexate.  All of the patient's questions and concerns were addressed. Niacinamide Counseling: I recommended taking niacin or niacinamide, also know as vitamin B3, twice daily. Recent evidence suggests that taking vitamin B3 (500 mg twice daily) can reduce the risk of actinic keratoses and non-melanoma skin cancers. Side effects of vitamin B3 include flushing and headache. Opioid Counseling: I discussed with the patient the potential side effects of opioids including but not limited to addiction, altered mental status, and depression. I stressed avoiding alcohol, benzodiazepines, muscle relaxants and sleep aids unless specifically okayed by a physician. The patient verbalized understanding of the proper use and possible adverse effects of opioids. All of the patient's questions and concerns were addressed. They were instructed to flush the remaining pills down the toilet if they did not need them for pain. Terbinafine Pregnancy And Lactation Text: This medication is Pregnancy Category B and is considered safe during pregnancy. It is also excreted in breast milk and breast feeding isn't recommended. Benzoyl Peroxide Counseling: Patient counseled that medicine may cause skin irritation and bleach clothing.  In the event of skin irritation, the patient was advised to reduce the amount of the drug applied or use it less frequently.   The patient verbalized understanding of the proper use and possible adverse effects of benzoyl peroxide.  All of the patient's questions and concerns were addressed. Otezla Pregnancy And Lactation Text: This medication is Pregnancy Category C and it isn't known if it is safe during pregnancy. It is unknown if it is excreted in breast milk. Olumiant Pregnancy And Lactation Text: Based on animal studies, Olumiant may cause embryo-fetal harm when administered to pregnant women.  The medication should not be used in pregnancy.  Breastfeeding is not recommended during treatment. Stelara Counseling:  I discussed with the patient the risks of ustekinumab including but not limited to immunosuppression, malignancy, posterior leukoencephalopathy syndrome, and serious infections.  The patient understands that monitoring is required including a PPD at baseline and must alert us or the primary physician if symptoms of infection or other concerning signs are noted. Elidel Counseling: Patient may experience a mild burning sensation during topical application. Elidel is not approved in children less than 2 years of age. There have been case reports of hematologic and skin malignancies in patients using topical calcineurin inhibitors although causality is questionable. Dapsone Pregnancy And Lactation Text: This medication is Pregnancy Category C and is not considered safe during pregnancy or breast feeding. Dupixent Pregnancy And Lactation Text: This medication likely crosses the placenta but the risk for the fetus is uncertain. This medication is excreted in breast milk. Bexarotene Counseling:  I discussed with the patient the risks of bexarotene including but not limited to hair loss, dry lips/skin/eyes, liver abnormalities, hyperlipidemia, pancreatitis, depression/suicidal ideation, photosensitivity, drug rash/allergic reactions, hypothyroidism, anemia, leukopenia, infection, cataracts, and teratogenicity.  Patient understands that they will need regular blood tests to check lipid profile, liver function tests, white blood cell count, thyroid function tests and pregnancy test if applicable. Fluconazole Counseling:  Patient counseled regarding adverse effects of fluconazole including but not limited to headache, diarrhea, nausea, upset stomach, liver function test abnormalities, taste disturbance, and stomach pain.  There is a rare possibility of liver failure that can occur when taking fluconazole.  The patient understands that monitoring of LFTs and kidney function test may be required, especially at baseline. The patient verbalized understanding of the proper use and possible adverse effects of fluconazole.  All of the patient's questions and concerns were addressed. Adbry Counseling: I discussed with the patient the risks of tralokinumab including but not limited to eye infection and irritation, cold sores, injection site reactions, worsening of asthma, allergic reactions and increased risk of parasitic infection.  Live vaccines should be avoided while taking tralokinumab. The patient understands that monitoring is required and they must alert us or the primary physician if symptoms of infection or other concerning signs are noted. Topical Sulfur Applications Counseling: Topical Sulfur Counseling: Patient counseled that this medication may cause skin irritation or allergic reactions.  In the event of skin irritation, the patient was advised to reduce the amount of the drug applied or use it less frequently.   The patient verbalized understanding of the proper use and possible adverse effects of topical sulfur application.  All of the patient's questions and concerns were addressed. Tranexamic Acid Pregnancy And Lactation Text: It is unknown if this medication is safe during pregnancy or breast feeding. Rhofade Counseling: Rhofade is a topical medication which can decrease superficial blood flow where applied. Side effects are uncommon and include stinging, redness and allergic reactions. Clindamycin Counseling: I counseled the patient regarding use of clindamycin as an antibiotic for prophylactic and/or therapeutic purposes. Clindamycin is active against numerous classes of bacteria, including skin bacteria. Side effects may include nausea, diarrhea, gastrointestinal upset, rash, hives, yeast infections, and in rare cases, colitis. Finasteride Male Counseling: Finasteride Counseling:  I discussed with the patient the risks of use of finasteride including but not limited to decreased libido, decreased ejaculate volume, gynecomastia, and depression. Women should not handle medication.  All of the patient's questions and concerns were addressed. Quinolones Counseling:  I discussed with the patient the risks of fluoroquinolones including but not limited to GI upset, allergic reaction, drug rash, diarrhea, dizziness, photosensitivity, yeast infections, liver function test abnormalities, tendonitis/tendon rupture. Opioid Pregnancy And Lactation Text: These medications can lead to premature delivery and should be avoided during pregnancy. These medications are also present in breast milk in small amounts. Oxybutynin Counseling:  I discussed with the patient the risks of oxybutynin including but not limited to skin rash, drowsiness, dry mouth, difficulty urinating, and blurred vision. Cimetidine Counseling:  I discussed with the patient the risks of Cimetidine including but not limited to gynecomastia, headache, diarrhea, nausea, drowsiness, arrhythmias, pancreatitis, skin rashes, psychosis, bone marrow suppression and kidney toxicity. Rhofade Pregnancy And Lactation Text: This medication has not been assigned a Pregnancy Risk Category. It is unknown if the medication is excreted in breast milk. Cellcept Counseling:  I discussed with the patient the risks of mycophenolate mofetil including but not limited to infection/immunosuppression, GI upset, hypokalemia, hypercholesterolemia, bone marrow suppression, lymphoproliferative disorders, malignancy, GI ulceration/bleed/perforation, colitis, interstitial lung disease, kidney failure, progressive multifocal leukoencephalopathy, and birth defects.  The patient understands that monitoring is required including a baseline creatinine and regular CBC testing. In addition, patient must alert us immediately if symptoms of infection or other concerning signs are noted. Enbrel Counseling:  I discussed with the patient the risks of etanercept including but not limited to myelosuppression, immunosuppression, autoimmune hepatitis, demyelinating diseases, lymphoma, and infections.  The patient understands that monitoring is required including a PPD at baseline and must alert us or the primary physician if symptoms of infection or other concerning signs are noted. Gabapentin Counseling: I discussed with the patient the risks of gabapentin including but not limited to dizziness, somnolence, fatigue and ataxia. Niacinamide Pregnancy And Lactation Text: These medications are considered safe during pregnancy. Methotrexate Pregnancy And Lactation Text: This medication is Pregnancy Category X and is known to cause fetal harm. This medication is excreted in breast milk. Benzoyl Peroxide Pregnancy And Lactation Text: This medication is Pregnancy Category C. It is unknown if benzoyl peroxide is excreted in breast milk. Topical Sulfur Applications Pregnancy And Lactation Text: This medication is Pregnancy Category C and has an unknown safety profile during pregnancy. It is unknown if this topical medication is excreted in breast milk. Adbry Pregnancy And Lactation Text: It is unknown if this medication will adversely affect pregnancy or breast feeding. Rinvoq Counseling: I discussed with the patient the risks of Rinvoq therapy including but not limited to upper respiratory tract infections, shingles, cold sores, bronchitis, nausea, cough, fever, acne, and headache. Live vaccines should be avoided.  This medication has been linked to serious infections; higher rate of mortality; malignancy and lymphoproliferative disorders; major adverse cardiovascular events; thrombosis; thrombocytopenia, anemia, and neutropenia; lipid elevations; liver enzyme elevations; and gastrointestinal perforations. Libtayo Counseling- I discussed with the patient the risks of Libtayo including but not limited to nausea, vomiting, diarrhea, and bone or muscle pain.  The patient verbalized understanding of the proper use and possible adverse effects of Libtayo.  All of the patient's questions and concerns were addressed. Topical Retinoid counseling:  Patient advised to apply a pea-sized amount only at bedtime and wait 30 minutes after washing their face before applying.  If too drying, patient may add a non-comedogenic moisturizer. The patient verbalized understanding of the proper use and possible adverse effects of retinoids.  All of the patient's questions and concerns were addressed. Mirvaso Counseling: Mirvaso is a topical medication which can decrease superficial blood flow where applied. Side effects are uncommon and include stinging, redness and allergic reactions. Clindamycin Pregnancy And Lactation Text: This medication can be used in pregnancy if certain situations. Clindamycin is also present in breast milk. Rituxan Counseling:  I discussed with the patient the risks of Rituxan infusions. Side effects can include infusion reactions, severe drug rashes including mucocutaneous reactions, reactivation of latent hepatitis and other infections and rarely progressive multifocal leukoencephalopathy.  All of the patient's questions and concerns were addressed. Bexarotene Pregnancy And Lactation Text: This medication is Pregnancy Category X and should not be given to women who are pregnant or may become pregnant. This medication should not be used if you are breast feeding. Valtrex Counseling: I discussed with the patient the risks of valacyclovir including but not limited to kidney damage, nausea, vomiting and severe allergy.  The patient understands that if the infection seems to be worsening or is not improving, they are to call. Finasteride Female Counseling: Finasteride Counseling:  I discussed with the patient the risks of use of finasteride including but not limited to decreased libido and sexual dysfunction. Explained the teratogenic nature of the medication and stressed the importance of not getting pregnant during treatment. All of the patient's questions and concerns were addressed. Prednisone Counseling:  I discussed with the patient the risks of prolonged use of prednisone including but not limited to weight gain, insomnia, osteoporosis, mood changes, diabetes, susceptibility to infection, glaucoma and high blood pressure.  In cases where prednisone use is prolonged, patients should be monitored with blood pressure checks, serum glucose levels and an eye exam.  Additionally, the patient may need to be placed on GI prophylaxis, PCP prophylaxis, and calcium and vitamin D supplementation and/or a bisphosphonate.  The patient verbalized understanding of the proper use and the possible adverse effects of prednisone.  All of the patient's questions and concerns were addressed. Nsaids Counseling: NSAID Counseling: I discussed with the patient that NSAIDs should be taken with food. Prolonged use of NSAIDs can result in the development of stomach ulcers.  Patient advised to stop taking NSAIDs if abdominal pain occurs.  The patient verbalized understanding of the proper use and possible adverse effects of NSAIDs.  All of the patient's questions and concerns were addressed. Doxycycline Counseling:  Patient counseled regarding possible photosensitivity and increased risk for sunburn.  Patient instructed to avoid sunlight, if possible.  When exposed to sunlight, patients should wear protective clothing, sunglasses, and sunscreen.  The patient was instructed to call the office immediately if the following severe adverse effects occur:  hearing changes, easy bruising/bleeding, severe headache, or vision changes.  The patient verbalized understanding of the proper use and possible adverse effects of doxycycline.  All of the patient's questions and concerns were addressed. Carac Counseling:  I discussed with the patient the risks of Carac including but not limited to erythema, scaling, itching, weeping, crusting, and pain. Rinvoq Pregnancy And Lactation Text: Based on animal studies, Rinvoq may cause embryo-fetal harm when administered to pregnant women.  The medication should not be used in pregnancy.  Breastfeeding is not recommended during treatment and for 6 days after the last dose. Griseofulvin Counseling:  I discussed with the patient the risks of griseofulvin including but not limited to photosensitivity, cytopenia, liver damage, nausea/vomiting and severe allergy.  The patient understands that this medication is best absorbed when taken with a fatty meal (e.g., ice cream or french fries). Solaraze Counseling:  I discussed with the patient the risks of Solaraze including but not limited to erythema, scaling, itching, weeping, crusting, and pain. Rituxan Pregnancy And Lactation Text: This medication is Pregnancy Category C and it isn't know if it is safe during pregnancy. It is unknown if this medication is excreted in breast milk but similar antibodies are known to be excreted. Finasteride Pregnancy And Lactation Text: This medication is absolutely contraindicated during pregnancy. It is unknown if it is excreted in breast milk. Isotretinoin Counseling: Patient should get monthly blood tests, not donate blood, not drive at night if vision affected, not share medication, and not undergo elective surgery for 6 months after tx completed. Side effects reviewed, pt to contact office should one occur. Valtrex Pregnancy And Lactation Text: this medication is Pregnancy Category B and is considered safe during pregnancy. This medication is not directly found in breast milk but it's metabolite acyclovir is present. Arava Counseling:  Patient counseled regarding adverse effects of Arava including but not limited to nausea, vomiting, abnormalities in liver function tests. Patients may develop mouth sores, rash, diarrhea, and abnormalities in blood counts. The patient understands that monitoring is required including LFTs and blood counts.  There is a rare possibility of scarring of the liver and lung problems that can occur when taking methotrexate. Persistent nausea, loss of appetite, pale stools, dark urine, cough, and shortness of breath should be reported immediately. Patient advised to discontinue Arava treatment and consult with a physician prior to attempting conception. The patient will have to undergo a treatment to eliminate Arava from the body prior to conception. Rifampin Counseling: I discussed with the patient the risks of rifampin including but not limited to liver damage, kidney damage, red-orange body fluids, nausea/vomiting and severe allergy. Taltz Counseling: I discussed with the patient the risks of ixekizumab including but not limited to immunosuppression, serious infections, worsening of inflammatory bowel disease and drug reactions.  The patient understands that monitoring is required including a PPD at baseline and must alert us or the primary physician if symptoms of infection or other concerning signs are noted. Bimzelx Counseling:  I discussed with the patient the risks of Bimzelx including but not limited to depression, immunosuppression, allergic reactions and infections.  The patient understands that monitoring is required including a PPD at baseline and must alert us or the primary physician if symptoms of infection or other concerning signs are noted. Hydroxyzine Counseling: Patient advised that the medication is sedating and not to drive a car after taking this medication.  Patient informed of potential adverse effects including but not limited to dry mouth, urinary retention, and blurry vision.  The patient verbalized understanding of the proper use and possible adverse effects of hydroxyzine.  All of the patient's questions and concerns were addressed. Wartpeel Counseling:  I discussed with the patient the risks of Wartpeel including but not limited to erythema, scaling, itching, weeping, crusting, and pain. Propranolol Counseling:  I discussed with the patient the risks of propranolol including but not limited to low heart rate, low blood pressure, low blood sugar, restlessness and increased cold sensitivity. They should call the office if they experience any of these side effects. Libtayo Pregnancy And Lactation Text: This medication is contraindicated in pregnancy and when breast feeding. Eucrisa Counseling: Patient may experience a mild burning sensation during topical application. Eucrisa is not approved in children less than 2 years of age.

## 2025-01-27 NOTE — PATIENT PROFILE ADULT. - NS PRO PT RIGHT SUPPORT PERSON
:  [facility]  [address] [place] [address]  Phone#  [phone]   Fax#  [phone]  *-*-*-*-*-*-*-*-*-*-*-*-*-*-*-*-*-*-*-*-*-*-*-*-*-*-*-*-*-*-*-*-*-*-*-*-*-*-*-*-*-*-*-*-*-*-*-*-*-*-*-*-*-*                                   Cardiology Follow Up      ENCOUNTER DATE: 25 11:10 AM  PATIENT NAME: Zenia Youssef   : 1949    MRN: [id number]  AGE:75 y.o.      SEX: female  ENCOUNTER PROVIDER:Cosmo King MD     PRIMARY CARE PHYSICIAN: Didi Talley PA-C    ACTIVE DIAGNOSIS AND PLAN    1. Paroxysmal atrial fibrillation (HCC)  Assessment & Plan:  Atrial fibrillation first noted 2023 when hospitalized with atrial fibrillation with RVR     Patient has occasional palpitations.  Sometimes they will last as long as 3 days.  She has no dizziness or lightheadedness with these palpitations and is able to go about her normal activities.    Consider Zio patch monitor in the future.    Xarelto 20 mg daily  Diltiazem  mg daily  Bisoprolol 5 mg daily  Orders:  -     POCT ECG  2. Nonobstructive atherosclerosis of coronary artery  Assessment & Plan:  2022 cardiac catheterization: Ostial circumflex lesion 50% stenosis.   3. mitral valve prolapse, torn chord, with moderate to severe regurgitation  Assessment & Plan:  Case reviewed with Dr Davidson who feels that the valve may need to be replaced. I do not feel patient is symptomatic enough to warrant replacement    Not on ARB due to abdominal pain which Dr. Miller felt was from the losartan     2023 echocardiogram: Normal left ventricular systolic function, EF 60% atrial fibrillation with diastolic dysfunction.  Sigmoid shaped septum.  Severe LA enlargement.  Mild RA dilated.  Aortic sclerosis.  Valve posterior leaflet prolapse with moderate regurgitation.  Mild to moderate tricuspid regurgitation.  PASP 59 mmHg.Z    2024 DEANGELO: LVEF 60%.  RV normal.  LA and RA dilated.  P2 and P3 of the posterior leaflet are  flail from ruptured cord PASP 54 mmHg.      Tried placing patient on carvedilol 3.125 mg 2 times a day and she called stating it is making dizzy and she is very SOB, worse than without the medication. She states she cannot drive taking this.  Coreg was discontinued.    GDMT:  Beta-blocker: Bisoprolol 2.5 mg daily (one half 5 mg tablet)  ACE/ARB/ARNI: Was on losartan and GI felt it was the cause of her abdominal pain.  Losartan discontinued and the pain resolved.  (Allergy to ARB's)  SGLT 2 agonist: Discussed with her starting Jardiance but she is on a fixed income and finding the co-pay for a month of Eliquis difficult.  I have not pushed her to do this since she is predominantly asymptomatic  Mineralocorticoid antagonist have not tried since patient runs a low blood pressure and has never actually been in congestive heart failure  4. Hypercholesterolemia  Assessment & Plan:  Lipid profile from 10/8/2024 excellent  Continue atorvastatin 20 mg daily  5. Aortic valve sclerosis  Assessment & Plan:  Aortic valve murmur noted on exam and aortic sclerosis noted on echo     INTERVAL HISTORY:        Patient is basically asymptomatic.  She has some dyspnea on exertion with stairs or vigorous activity.  However it has not changed in the last several years.  Her dyspnea on exertion is not severe.  She has some palpitations with a history of paroxysmal atrial fibrillation.  Sometimes they will last up to 3 days but they are not associated with shortness of breath dizziness or lightheadedness.  She has no lower extremity edema.  I do think she minimizes her symptoms but is doing quite well.    DISCUSSION/PLAN:          Return in 6 months    CARDIOLOGY HISTORY AND TESTING  Patient was 1st seen on 05/27/2021. Patient is a is a 72-year-old female who developed symptoms of chest tightness radiating into her neck and jaw with shortness of breath on physical exertion such as going up a flight of stairs.  The discomfort would melba  with rest.  Her primary care physician ordered a stress echocardiogram which was nondiagnostic and equivocal for myocardial ischemia.  Mild mitral regurgitation was noted on the echocardiogram.  Patient states that since the stress test, her exertional chest tightness and shortness of breath has been much less severe.  Patient has no family history of coronary artery disease.  A sister has a murmur.  She is a lifetime nonsmoker.  Patient had a melanoma removed 11 years ago and has had no evidence of recurrence.    06/23/2021 nuclear stress test: Resting hypertension with exaggerated hypertensive response to exercise negative treadmill stress test for angina pectoris but with abnormal ST depression with exercise. Exercise induced ventricular ectopy. Normal LV systolic function, EF 77%. Normal tomographic perfusion series.    06/01/2021 echocardiogram:  Normal LV function EF 60%, mild concentric LVH.  Grade 1 diastolic dysfunction.  Mild-to-moderate left atrial enlargement.  Moderate mitral regurgitation.    02/24/2022 cardiac catheterization: Ostial circumflex lesion 50% stenosis.    7/12-7/14/2023 [facility] new onset atrial fibrillation with RVR    7/12/2023 echocardiogram: Normal left ventricular systolic function, EF 60% atrial fibrillation with diastolic dysfunction.  Sigmoid shaped septum.  Severe LA enlargement.  Mild RA dilated.  Aortic sclerosis.  Valve posterior leaflet prolapse with moderate regurgitation.  Mild to moderate tricuspid regurgitation.  PASP 59 mmHg.Z    1/22/2024 [facility]ANGELO: LVEF 60%.  RV normal.  LA and RA dilated.  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      ACTIVE PROBLEM LIST  Patient Active Problem List   Diagnosis    Generalized anxiety disorder    Diverticulosis    GERD without esophagitis    Hiatal hernia    Hypercholesterolemia    Essential hypertension    Irritable bowel syndrome    Malignant melanoma of skin (HCC)    Age-related osteoporosis  without current pathological fracture    Squamous cell carcinoma of skin    Splenic artery aneurysm (HCC)    Other specified disorders of rotator cuff syndrome of shoulder and allied disorders    mitral valve prolapse, torn chord, with moderate to severe regurgitation    Nonobstructive atherosclerosis of coronary artery    Paroxysmal atrial fibrillation (HCC)    Aortic valve sclerosis    Allergic rhinitis    Vertigo       TODAY'S EKG  Results for orders placed or performed in visit on 01/27/25   POCT ECG    Narrative    Sinus bradycardia at a rate of 58 bpm.  Possible left atrial enlargement.  Borderline ECG         Lab Studies:    Lab Results   Component Value Date    CHOLESTEROL 141 10/08/2024    CHOLESTEROL 140 04/11/2024    CHOLESTEROL 132 10/03/2023     Lab Results   Component Value Date    TRIG 74 10/08/2024    TRIG 72 04/11/2024    TRIG 78 10/03/2023     Lab Results   Component Value Date    HDL 48 (L) 10/08/2024    HDL 47 (L) 04/11/2024    HDL 48 (L) 10/03/2023     Lab Results   Component Value Date    LDLCALC 78 10/08/2024    LDLCALC 79 04/11/2024    LDLCALC 68 10/03/2023     Lab Results   Component Value Date    LDLDIRECT 112 05/07/2014     Lab Results   Component Value Date    EGFR 77 10/08/2024    EGFR 81 04/11/2024    EGFR 69 10/03/2023    SODIUM 140 10/08/2024    SODIUM 142 04/11/2024    SODIUM 139 10/03/2023    K 4.1 10/08/2024    K 4.1 04/11/2024    K 4.2 10/03/2023     10/08/2024     04/11/2024     10/03/2023    CO2 29 10/08/2024    CO2 28 04/11/2024    CO2 28 10/03/2023    ANIONGAP 5 05/07/2014    BUN 24 10/08/2024    BUN 26 (H) 04/11/2024    BUN 26 (H) 10/03/2023    CREATININE 0.75 10/08/2024    CREATININE 0.72 04/11/2024    CREATININE 0.82 10/03/2023     Lab Results   Component Value Date    WBC 5.29 10/08/2024    WBC 7.16 10/03/2023    WBC 6.71 07/20/2023    HGB 13.2 10/08/2024    HGB 14.0 10/03/2023    HGB 12.9 07/20/2023    HCT 40.9 10/08/2024    HCT 42.3 10/03/2023    HCT  39.8 07/20/2023    MCV 93 10/08/2024    MCV 91 10/03/2023    MCV 92 07/20/2023    MCH 29.9 10/08/2024    MCH 30.2 10/03/2023    MCH 29.9 07/20/2023    MCHC 32.3 10/08/2024    MCHC 33.1 10/03/2023    MCHC 32.4 07/20/2023     10/08/2024     10/03/2023     07/20/2023      Lab Results   Component Value Date    GLUCOSE 98 05/07/2014    CALCIUM 9.2 10/08/2024    CALCIUM 9.3 04/11/2024    CALCIUM 9.4 10/03/2023    ALB 3.8 10/08/2024    ALB 4.1 04/11/2024    ALB 4.2 10/03/2023    TP 6.8 10/08/2024    TP 7.4 04/11/2024    TP 7.2 10/03/2023    AST 21 10/08/2024    AST 23 04/11/2024    AST 18 10/03/2023    ALT 27 10/08/2024    ALT 26 04/11/2024    ALT 28 10/03/2023    ALKPHOS 78 10/08/2024    ALKPHOS 80 04/11/2024    ALKPHOS 76 10/03/2023    BILITOT 0.37 05/07/2014       Lab Results   Component Value Date     (H) 07/12/2023       Current Outpatient Medications:     atorvastatin (LIPITOR) 20 mg tablet, Take 1 tablet (20 mg total) by mouth daily, Disp: 90 tablet, Rfl: 1    BIOTIN PO, Take by mouth, Disp: , Rfl:     bisoprolol (ZEBETA) 5 mg tablet, Take 0.5 tablets (2.5 mg total) by mouth daily, Disp: 15 tablet, Rfl: 2    calcium carbonate (OS-BRYCE) 600 MG tablet, Take 600 mg by mouth 2 (two) times a day with meals, Disp: , Rfl:     Cholecalciferol (VITAMIN D3) 1000 units CAPS, Take 2 capsules by mouth daily, Disp: , Rfl:     diltiazem (CARDIZEM CD) 240 mg 24 hr capsule, Take 1 capsule (240 mg total) by mouth daily, Disp: 90 capsule, Rfl: 1    Lactobacillus Rhamnosus, GG, (CULTURELLE PO), Take by mouth daily, Disp: , Rfl:     melatonin 1 mg, Take 1 mg by mouth daily at bedtime as needed, Disp: , Rfl:     rivaroxaban (Xarelto) 20 mg tablet, Take 1 tablet (20 mg total) by mouth daily with breakfast, Disp: 30 tablet, Rfl: 5    Vitamin Mixture ([place]-C) 500-60 MG TABS, Take by mouth, Disp: , Rfl:     ALPRAZolam (XANAX) 0.25 mg tablet, TAKE ONE TABLET BY MOUTH EVERY DAY AS NEEDED FOR ANXIETY, Disp: 30  tablet, Rfl: 0  Allergies   Allergen Reactions    Losartan Abdominal Pain     Recent demonstrates that this side effect will be experienced by the entire class of drugs.    Macrobid [Nitrofurantoin] GI Intolerance    Penicillins Other (See Comments)     Childhood. Per pt, unsure of reaction    Protonix [Pantoprazole] Nausea Only    Wound Dressing Adhesive Other (See Comments)     bandaid if on for more than 24 hours       Past Medical History:   Diagnosis Date    Anxiety     Diverticulosis     GERD (gastroesophageal reflux disease)     Heart murmur last year    Hiatal hernia     Hypertension     Melanoma (HCC)     Osteoporosis      Social History     Socioeconomic History    Marital status: /Civil Union     Spouse name: Not on file    Number of children: Not on file    Years of education: Not on file    Highest education level: Not on file   Occupational History    Occupation: retired - [employer]   Tobacco Use    Smoking status: Never    Smokeless tobacco: Never    Tobacco comments:     No secondhand smoke exposure    Vaping Use    Vaping status: Never Used   Substance and Sexual Activity    Alcohol use: Not Currently     Comment: very rare socially    Drug use: Never    Sexual activity: Not Currently     Partners: Male   Other Topics Concern    Not on file   Social History Narrative    Not on file     Social Drivers of Health     Financial Resource Strain: Low Risk  (4/10/2023)    Overall Financial Resource Strain (CARDIA)     Difficulty of Paying Living Expenses: Not hard at all   Food Insecurity: No Food Insecurity (4/24/2024)    Nursing - Inadequate Food Risk Classification     Worried About Running Out of Food in the Last Year: Never true     Ran Out of Food in the Last Year: Never true     Ran Out of Food in the Last Year: Not on file   Transportation Needs: No Transportation Needs (4/24/2024)    PRAPARE - Transportation     Lack of Transportation (Medical): No     Lack of  Transportation (Non-Medical): No   Physical Activity: Not on file   Stress: Not on file   Social Connections: Not on file   Intimate Partner Violence: Not on file   Housing Stability: Low Risk  (4/24/2024)    Housing Stability Vital Sign     Unable to Pay for Housing in the Last Year: No     Number of Times Moved in the Last Year: 1     Homeless in the Last Year: No      Family History   Problem Relation Age of Onset    Uterine cancer Mother     Cancer Mother     Prostate cancer Father     Cancer Father     Osteoporosis Sister      Past Surgical History:   Procedure Laterality Date    CARDIAC CATHETERIZATION N/A 02/24/2022    Procedure: Cardiac catheterization;  Surgeon: Harinder Calvo MD;  Location: [facility];  Service: Cardiology    CARDIAC CATHETERIZATION N/A 02/24/2022    Procedure: Cardiac Coronary Angiogram;  Surgeon: Harinder Calvo MD;  Location: [facility];  Service: Cardiology    EGD  08/24/2021    JUAN Miller MD.- Normal duodenum; normal stomach; non-severe esophagitis. Bx: Neg. H. pylori, ulceration and dysplasia.    EGD AND COLONOSCOPY  05/14/2014    NORMAL/HP    HYSTERECTOMY  ?       PREVIOUS WEIGHTS:   Wt Readings from Last 10 Encounters:   01/27/25 56.6 kg (124 lb 12.8 oz)   10/23/24 56.8 kg (125 lb 3.2 oz)   09/20/24 57.2 kg (126 lb)   08/23/24 58 kg (127 lb 12.8 oz)   05/06/24 57 kg (125 lb 9.6 oz)   04/29/24 57 kg (125 lb 9.6 oz)   04/24/24 56.7 kg (125 lb)   04/22/24 57.2 kg (126 lb 3.2 oz)   04/10/24 57.2 kg (126 lb)   02/20/24 56.9 kg (125 lb 6.4 oz)        Review of Systems:  Review of Systems   Respiratory:  Negative for cough, choking, chest tightness, shortness of breath and wheezing.    Cardiovascular:  Positive for palpitations. Negative for chest pain and leg swelling.   Skin:  Negative for rash.   Neurological:  Negative for dizziness, tremors, syncope, weakness, light-headedness, numbness and headaches.   Psychiatric/Behavioral:  Negative for agitation and  "behavioral problems. The patient is not hyperactive.        Physical Exam:  /62   Pulse 58   Ht 5' 3\" (1.6 m)   Wt 56.6 kg (124 lb 12.8 oz)   BMI 22.11 kg/m²     Physical Exam  Constitutional:       General: She is not in acute distress.     Appearance: She is well-developed.   HENT:      Head: Normocephalic and atraumatic.   Neck:      Thyroid: No thyromegaly.      Vascular: No carotid bruit or JVD.      Trachea: No tracheal deviation.   Cardiovascular:      Rate and Rhythm: Normal rate and regular rhythm.      Pulses: Normal pulses.      Heart sounds: Murmur (Holosystolic murmur loudest at the apex and radiates to the axilla but heard throughout the chest without radiation to the neck.) heard.      Systolic murmur is present with a grade of 3/6.      No friction rub. No gallop.   Pulmonary:      Effort: Pulmonary effort is normal. No respiratory distress.      Breath sounds: Normal breath sounds. No wheezing, rhonchi or rales.   Chest:      Chest wall: No tenderness.   Musculoskeletal:         General: Normal range of motion.      Cervical back: Normal range of motion and neck supple.      Right lower leg: No edema.      Left lower leg: No edema.   Skin:     General: Skin is warm and dry.   Neurological:      General: No focal deficit present.      Mental Status: She is alert and oriented to person, place, and time.   Psychiatric:         Mood and Affect: Mood normal.         Behavior: Behavior normal.         Thought Content: Thought content normal.         Judgment: Judgment normal.       ======================================================  Imaging:   Results Review Statement: No pertinent imaging studies reviewed.    Portions of the record may have been created with voice recognition software. Occasional wrong word or \"sound a like\" substitutions may have occurred due to the inherent limitations of voice recognition software. Read the chart carefully and recognize, using context, where " substitutions have occurred.    SIGNATURES:   Cosmo King MD    Yes